# Patient Record
Sex: MALE | Race: WHITE
[De-identification: names, ages, dates, MRNs, and addresses within clinical notes are randomized per-mention and may not be internally consistent; named-entity substitution may affect disease eponyms.]

---

## 2017-04-26 ENCOUNTER — HOSPITAL ENCOUNTER (INPATIENT)
Dept: HOSPITAL 17 - NEPI | Age: 22
LOS: 13 days | Discharge: NURSING, REHAB OR LONG-TERM CARE | DRG: 955 | End: 2017-05-09
Attending: SURGERY | Admitting: SURGERY
Payer: MEDICAID

## 2017-04-26 VITALS — OXYGEN SATURATION: 99 %

## 2017-04-26 VITALS
RESPIRATION RATE: 16 BRPM | DIASTOLIC BLOOD PRESSURE: 70 MMHG | TEMPERATURE: 98 F | HEART RATE: 105 BPM | SYSTOLIC BLOOD PRESSURE: 112 MMHG | OXYGEN SATURATION: 95 %

## 2017-04-26 VITALS — OXYGEN SATURATION: 100 %

## 2017-04-26 VITALS
RESPIRATION RATE: 16 BRPM | SYSTOLIC BLOOD PRESSURE: 102 MMHG | DIASTOLIC BLOOD PRESSURE: 61 MMHG | OXYGEN SATURATION: 100 % | HEART RATE: 134 BPM | TEMPERATURE: 98.4 F

## 2017-04-26 VITALS — BODY MASS INDEX: 23.53 KG/M2 | OXYGEN SATURATION: 100 % | WEIGHT: 149.91 LBS | HEIGHT: 67 IN

## 2017-04-26 VITALS — HEART RATE: 130 BPM

## 2017-04-26 VITALS — HEART RATE: 140 BPM

## 2017-04-26 DIAGNOSIS — S80.811A: ICD-10-CM

## 2017-04-26 DIAGNOSIS — S20.312A: ICD-10-CM

## 2017-04-26 DIAGNOSIS — M62.838: ICD-10-CM

## 2017-04-26 DIAGNOSIS — G93.6: ICD-10-CM

## 2017-04-26 DIAGNOSIS — J69.0: ICD-10-CM

## 2017-04-26 DIAGNOSIS — S22.059A: ICD-10-CM

## 2017-04-26 DIAGNOSIS — F17.210: ICD-10-CM

## 2017-04-26 DIAGNOSIS — S22.049A: ICD-10-CM

## 2017-04-26 DIAGNOSIS — S40.811A: ICD-10-CM

## 2017-04-26 DIAGNOSIS — B49: ICD-10-CM

## 2017-04-26 DIAGNOSIS — F02.81: ICD-10-CM
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
## 2017-04-26 DIAGNOSIS — S06.6X0A: Primary | ICD-10-CM

## 2017-04-26 DIAGNOSIS — S12.110A: ICD-10-CM

## 2017-04-26 DIAGNOSIS — G93.40: ICD-10-CM

## 2017-04-26 DIAGNOSIS — A41.9: ICD-10-CM

## 2017-04-26 DIAGNOSIS — J96.01: ICD-10-CM

## 2017-04-26 DIAGNOSIS — F19.10: ICD-10-CM

## 2017-04-26 DIAGNOSIS — S22.039A: ICD-10-CM

## 2017-04-26 DIAGNOSIS — S12.500A: ICD-10-CM

## 2017-04-26 DIAGNOSIS — S80.812A: ICD-10-CM

## 2017-04-26 DIAGNOSIS — R00.1: ICD-10-CM

## 2017-04-26 DIAGNOSIS — S01.312A: ICD-10-CM

## 2017-04-26 DIAGNOSIS — F31.9: ICD-10-CM

## 2017-04-26 DIAGNOSIS — S06.360A: ICD-10-CM
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
## 2017-04-26 DIAGNOSIS — M51.26: ICD-10-CM

## 2017-04-26 DIAGNOSIS — S90.02XA: ICD-10-CM

## 2017-04-26 DIAGNOSIS — I10: ICD-10-CM

## 2017-04-26 DIAGNOSIS — Z88.5: ICD-10-CM

## 2017-04-26 DIAGNOSIS — S27.321A: ICD-10-CM

## 2017-04-26 DIAGNOSIS — S40.812A: ICD-10-CM
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
## 2017-04-26 DIAGNOSIS — S20.311A: ICD-10-CM

## 2017-04-26 DIAGNOSIS — I82.621: ICD-10-CM

## 2017-04-26 DIAGNOSIS — F19.20: ICD-10-CM
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
## 2017-04-26 LAB
AMPHETAMINE, URINE: (no result)
AMPHETAMINE, URINE: (no result)
APTT BLD: 30 SEC (ref 24.3–30.1)
BACTERIA #/AREA URNS HPF: (no result) /HPF
BARBITURATES, URINE: (no result)
BARBITURATES, URINE: (no result)
BASE EXCESS BLD CALC-SCNC: -1.3 MMOL/L (ref -2–2)
BASOPHILS # BLD AUTO: 0.1 TH/MM3 (ref 0–0.2)
BASOPHILS NFR BLD: 0.2 % (ref 0–2)
BENZODIAZEPINES PNL UR: 98 % (ref 90–100)
BLOOD GAS CARBOXYHEMOGLOBIN: 0.7 % (ref 0–4)
BLOOD GAS HCO3: 24 MMOL/L (ref 22–26)
BLOOD GAS OXYGEN CONTENT: 16.8 VOL % (ref 12–20)
BLOOD GAS PCO2: 46 MMHG (ref 38–42)
COCAINE UR-MCNC: (no result) NG/ML
COCAINE UR-MCNC: (no result) NG/ML
COLOR UR: YELLOW
COMMENT (UR): (no result)
CRITICAL VALUE: NO
CULTURE IF INDICATED: (no result)
DRAW SITE: (no result)
EOSINOPHIL # BLD: 0.1 TH/MM3 (ref 0–0.4)
EOSINOPHIL NFR BLD: 0.2 % (ref 0–4)
ERYTHROCYTE [DISTWIDTH] IN BLOOD BY AUTOMATED COUNT: 12.7 % (ref 11.6–17.2)
GLUCOSE UR STRIP-MCNC: (no result) MG/DL
HCT VFR BLD CALC: 36.2 % (ref 39–51)
HEMO FLAGS: (no result)
HGB UR QL STRIP: (no result)
HYALINE CASTS #/AREA URNS LPF: 18 /LPF
I-STAT POTASSIUM: 3.6 MMOL/L (ref 3.5–4.9)
I-STAT SODIUM: 140 MMOL/L (ref 138–146)
INR PPP: 1.1 RATIO
KETONES UR STRIP-MCNC: (no result) MG/DL
LYMPHOCYTES # BLD AUTO: 5.8 TH/MM3 (ref 1–4.8)
LYMPHOCYTES NFR BLD AUTO: 15.4 % (ref 9–44)
MCH RBC QN AUTO: 29.5 PG (ref 27–34)
MCHC RBC AUTO-ENTMCNC: 33.8 % (ref 32–36)
MCV RBC AUTO: 87.3 FL (ref 80–100)
METAMYELOCYTES NFR BLD: 1 % (ref 0–1)
METHGB MFR BLDA: 1.2 % (ref 0–2)
MONOCYTES NFR BLD: 4.9 % (ref 0–8)
MUCOUS THREADS #/AREA URNS LPF: (no result) /LPF
MYELOCYTES NFR BLD: 2 % (ref 0–0)
NEUTROPHILS # BLD AUTO: 29.8 TH/MM3 (ref 1.8–7.7)
NEUTROPHILS NFR BLD AUTO: 79.3 % (ref 16–70)
NEUTS BAND # BLD MANUAL: 27.8 TH/MM3 (ref 1.8–7.7)
NEUTS BAND NFR BLD: 19 % (ref 0–6)
NEUTS SEG NFR BLD MANUAL: 52 % (ref 16–70)
NITRITE UR QL STRIP: (no result)
NUMBER OF ARTERIAL PUNCTURES: 1
O2/TOTAL GAS SETTING VFR VENT: 100 %
OXYGEN DEVICE: (no result)
PLAT MORPH BLD: NORMAL
PLATELET # BLD: 470 TH/MM3 (ref 150–450)
PLATELET BLD QL SMEAR: (no result)
PO2 BLD: 548 MMHG (ref 61–120)
PROTHROMBIN TIME: 12.4 SEC (ref 9.8–11.6)
RBC # BLD AUTO: 4.14 MIL/MM3 (ref 4.5–5.9)
SALICYLATES SERPL-MCNC: 11.1 G/DL (ref 12–16)
SCAN/DIFF: (no result)
SP GR UR STRIP: (no result) (ref 1–1.03)
SQUAMOUS #/AREA URNS HPF: 2 /HPF (ref 0–5)
STAT: NO
TEMP CORR TO: 98.6
ULNAR PULSE: PRESENT
VENT SETTINGS: (no result)
WBC # BLD AUTO: 37.6 TH/MM3 (ref 4–11)
WBC DIFF SAMPLE: 100

## 2017-04-26 PROCEDURE — 76937 US GUIDE VASCULAR ACCESS: CPT

## 2017-04-26 PROCEDURE — 82565 ASSAY OF CREATININE: CPT

## 2017-04-26 PROCEDURE — 70486 CT MAXILLOFACIAL W/O DYE: CPT

## 2017-04-26 PROCEDURE — G0390 TRAUMA RESPONS W/HOSP CRITI: HCPCS

## 2017-04-26 PROCEDURE — 70450 CT HEAD/BRAIN W/O DYE: CPT

## 2017-04-26 PROCEDURE — 87040 BLOOD CULTURE FOR BACTERIA: CPT

## 2017-04-26 PROCEDURE — 93970 EXTREMITY STUDY: CPT

## 2017-04-26 PROCEDURE — 71010: CPT

## 2017-04-26 PROCEDURE — 80053 COMPREHEN METABOLIC PANEL: CPT

## 2017-04-26 PROCEDURE — 09Q1XZZ REPAIR LEFT EXTERNAL EAR, EXTERNAL APPROACH: ICD-10-PCS | Performed by: DENTIST

## 2017-04-26 PROCEDURE — 80307 DRUG TEST PRSMV CHEM ANLYZR: CPT

## 2017-04-26 PROCEDURE — 73610 X-RAY EXAM OF ANKLE: CPT

## 2017-04-26 PROCEDURE — 85025 COMPLETE CBC W/AUTO DIFF WBC: CPT

## 2017-04-26 PROCEDURE — 85007 BL SMEAR W/DIFF WBC COUNT: CPT

## 2017-04-26 PROCEDURE — 84520 ASSAY OF UREA NITROGEN: CPT

## 2017-04-26 PROCEDURE — 86920 COMPATIBILITY TEST SPIN: CPT

## 2017-04-26 PROCEDURE — 87070 CULTURE OTHR SPECIMN AEROBIC: CPT

## 2017-04-26 PROCEDURE — 84132 ASSAY OF SERUM POTASSIUM: CPT

## 2017-04-26 PROCEDURE — 84145 PROCALCITONIN (PCT): CPT

## 2017-04-26 PROCEDURE — 94640 AIRWAY INHALATION TREATMENT: CPT

## 2017-04-26 PROCEDURE — 87086 URINE CULTURE/COLONY COUNT: CPT

## 2017-04-26 PROCEDURE — 87641 MR-STAPH DNA AMP PROBE: CPT

## 2017-04-26 PROCEDURE — 87116 MYCOBACTERIA CULTURE: CPT

## 2017-04-26 PROCEDURE — 85018 HEMOGLOBIN: CPT

## 2017-04-26 PROCEDURE — 70496 CT ANGIOGRAPHY HEAD: CPT

## 2017-04-26 PROCEDURE — 87186 SC STD MICRODIL/AGAR DIL: CPT

## 2017-04-26 PROCEDURE — P9016 RBC LEUKOCYTES REDUCED: HCPCS

## 2017-04-26 PROCEDURE — 83735 ASSAY OF MAGNESIUM: CPT

## 2017-04-26 PROCEDURE — 85730 THROMBOPLASTIN TIME PARTIAL: CPT

## 2017-04-26 PROCEDURE — 82435 ASSAY OF BLOOD CHLORIDE: CPT

## 2017-04-26 PROCEDURE — 94664 DEMO&/EVAL PT USE INHALER: CPT

## 2017-04-26 PROCEDURE — 84100 ASSAY OF PHOSPHORUS: CPT

## 2017-04-26 PROCEDURE — L0810 HALO CERVICAL INTO JCKT VEST: HCPCS

## 2017-04-26 PROCEDURE — 36430 TRANSFUSION BLD/BLD COMPNT: CPT

## 2017-04-26 PROCEDURE — 72170 X-RAY EXAM OF PELVIS: CPT

## 2017-04-26 PROCEDURE — 82947 ASSAY GLUCOSE BLOOD QUANT: CPT

## 2017-04-26 PROCEDURE — 72020 X-RAY EXAM OF SPINE 1 VIEW: CPT

## 2017-04-26 PROCEDURE — 84295 ASSAY OF SERUM SODIUM: CPT

## 2017-04-26 PROCEDURE — 80048 BASIC METABOLIC PNL TOTAL CA: CPT

## 2017-04-26 PROCEDURE — 94002 VENT MGMT INPAT INIT DAY: CPT

## 2017-04-26 PROCEDURE — L0150 CERV SEMI-RIG ADJ MOLDED CHN: HCPCS

## 2017-04-26 PROCEDURE — 86403 PARTICLE AGGLUT ANTBDY SCRN: CPT

## 2017-04-26 PROCEDURE — 90471 IMMUNIZATION ADMIN: CPT

## 2017-04-26 PROCEDURE — 82948 REAGENT STRIP/BLOOD GLUCOSE: CPT

## 2017-04-26 PROCEDURE — 87205 SMEAR GRAM STAIN: CPT

## 2017-04-26 PROCEDURE — 85610 PROTHROMBIN TIME: CPT

## 2017-04-26 PROCEDURE — 74177 CT ABD & PELVIS W/CONTRAST: CPT

## 2017-04-26 PROCEDURE — 96374 THER/PROPH/DIAG INJ IV PUSH: CPT

## 2017-04-26 PROCEDURE — 5A1955Z RESPIRATORY VENTILATION, GREATER THAN 96 CONSECUTIVE HOURS: ICD-10-PCS | Performed by: SURGERY

## 2017-04-26 PROCEDURE — 94150 VITAL CAPACITY TEST: CPT

## 2017-04-26 PROCEDURE — 83930 ASSAY OF BLOOD OSMOLALITY: CPT

## 2017-04-26 PROCEDURE — 72131 CT LUMBAR SPINE W/O DYE: CPT

## 2017-04-26 PROCEDURE — 99291 CRITICAL CARE FIRST HOUR: CPT

## 2017-04-26 PROCEDURE — 87206 SMEAR FLUORESCENT/ACID STAI: CPT

## 2017-04-26 PROCEDURE — 86850 RBC ANTIBODY SCREEN: CPT

## 2017-04-26 PROCEDURE — 96375 TX/PRO/DX INJ NEW DRUG ADDON: CPT

## 2017-04-26 PROCEDURE — 71260 CT THORAX DX C+: CPT

## 2017-04-26 PROCEDURE — 85014 HEMATOCRIT: CPT

## 2017-04-26 PROCEDURE — 85027 COMPLETE CBC AUTOMATED: CPT

## 2017-04-26 PROCEDURE — 61210 BURR HOLE IMPLT VENTR CATH: CPT

## 2017-04-26 PROCEDURE — 72128 CT CHEST SPINE W/O DYE: CPT

## 2017-04-26 PROCEDURE — 81001 URINALYSIS AUTO W/SCOPE: CPT

## 2017-04-26 PROCEDURE — G0481 DRUG TEST DEF 8-14 CLASSES: HCPCS

## 2017-04-26 PROCEDURE — 86901 BLOOD TYPING SEROLOGIC RH(D): CPT

## 2017-04-26 PROCEDURE — 93306 TTE W/DOPPLER COMPLETE: CPT

## 2017-04-26 PROCEDURE — 36600 WITHDRAWAL OF ARTERIAL BLOOD: CPT

## 2017-04-26 PROCEDURE — 87015 SPECIMEN INFECT AGNT CONCNTJ: CPT

## 2017-04-26 PROCEDURE — 94770: CPT

## 2017-04-26 PROCEDURE — 94003 VENT MGMT INPAT SUBQ DAY: CPT

## 2017-04-26 PROCEDURE — 72125 CT NECK SPINE W/O DYE: CPT

## 2017-04-26 PROCEDURE — 82805 BLOOD GASES W/O2 SATURATION: CPT

## 2017-04-26 PROCEDURE — 87147 CULTURE TYPE IMMUNOLOGIC: CPT

## 2017-04-26 PROCEDURE — 36556 INSERT NON-TUNNEL CV CATH: CPT

## 2017-04-26 PROCEDURE — 009630Z DRAINAGE OF CEREBRAL VENTRICLE WITH DRAINAGE DEVICE, PERCUTANEOUS APPROACH: ICD-10-PCS | Performed by: NEUROLOGICAL SURGERY

## 2017-04-26 PROCEDURE — 87102 FUNGUS ISOLATION CULTURE: CPT

## 2017-04-26 PROCEDURE — 80202 ASSAY OF VANCOMYCIN: CPT

## 2017-04-26 PROCEDURE — 86900 BLOOD TYPING SEROLOGIC ABO: CPT

## 2017-04-26 PROCEDURE — L0172 CERV COL SR FOAM 2PC PRE OTS: HCPCS

## 2017-04-26 PROCEDURE — 84155 ASSAY OF PROTEIN SERUM: CPT

## 2017-04-26 RX ADMIN — SODIUM CHLORIDE SCH MLS/HR: 234 INJECTION INTRAMUSCULAR; INTRAVENOUS; SUBCUTANEOUS at 20:00

## 2017-04-26 RX ADMIN — SODIUM CHLORIDE SCH MLS/HR: 234 INJECTION INTRAMUSCULAR; INTRAVENOUS; SUBCUTANEOUS at 21:37

## 2017-04-26 RX ADMIN — PROPOFOL SCH MLS/HR: 10 INJECTION, EMULSION INTRAVENOUS at 21:07

## 2017-04-26 RX ADMIN — IPRATROPIUM BROMIDE AND ALBUTEROL SULFATE SCH AMPULE: .5; 3 SOLUTION RESPIRATORY (INHALATION) at 21:46

## 2017-04-26 RX ADMIN — BACITRACIN SCH APPLIC: 500 OINTMENT TOPICAL at 21:00

## 2017-04-26 RX ADMIN — PYRIDOXINE HYDROCHLORIDE SCH MLS/HR: 100 INJECTION, SOLUTION INTRAMUSCULAR; INTRAVENOUS at 20:00

## 2017-04-26 RX ADMIN — POLYVINYL ALCOHOL SCH DROP: 14 SOLUTION/ DROPS OPHTHALMIC at 20:00

## 2017-04-26 RX ADMIN — DOCUSATE SODIUM SCH MG: 50 LIQUID ORAL at 21:00

## 2017-04-26 RX ADMIN — Medication SCH ML: at 21:00

## 2017-04-26 RX ADMIN — CHLORHEXIDINE GLUCONATE 0.12% ORAL RINSE SCH ML: 1.2 LIQUID ORAL at 20:00

## 2017-04-26 RX ADMIN — FAMOTIDINE SCH MG: 10 INJECTION, SOLUTION INTRAVENOUS at 21:00
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
## 2017-04-26 NOTE — PD.OP
__________________________________________________





Operative Report


Date of Surgery:  Apr 26, 2017


Preoperative Diagnosis:  


Severe traumatic brain injury with subarachnoid and intraventricular hemorrhage


Postoperative Diagnosis:  


Same


Procedure:


Right frontal twist drill hole ventriculostomy placement


Anesthesia:


Local with sedation


Surgeon:


Trey Hall M.D.


Assistant(s):


None


Operation and Findings:


Following continuation of Diprivan drip and fentanyl bolus with the oxygen 

saturation and hemodynamic monitoring in the intensive care unit, the right 

frontal region was shaved and the prep with chlor prep and sterilely draped.  

Verbal consent was obtained from the mother. Using landmarks of 11 cm behind 

the nasion and 3 cm to the right of the midline, a 1 cm scalp incision was made 

after infiltrating with 1% lidocaine with epinephrine solution.  With a 

handheld drill a twist drill hole was made in the underlying dura penetrated 

with a blunt probe.  The bactiseal ventriculostomy catheter was then passed 

into the lateral ventricle at a depth of 7 cm the CSF encountered with an 

opening pressure around 88 cm H20.  After drainage of CSF the pressures were 

down to 7 cm H20. The distal end of the ventriculostomy was then tunneled under 

the scalp with a trocar and secured to the exit site with a 3-0 nylon thigh and 

connected to a drainage bag.  Scalp incision site was approximated with 3-0 

nylon interrupted stitches  A sterile dressing was applied.  There were no 

complications and blood loss was less than 10 cc.








Trey Hall MD Apr 26, 2017 19:07
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
## 2017-04-27 VITALS — HEART RATE: 138 BPM

## 2017-04-27 VITALS
OXYGEN SATURATION: 99 % | TEMPERATURE: 98.6 F | HEART RATE: 119 BPM | RESPIRATION RATE: 20 BRPM | SYSTOLIC BLOOD PRESSURE: 98 MMHG | DIASTOLIC BLOOD PRESSURE: 59 MMHG

## 2017-04-27 VITALS — OXYGEN SATURATION: 100 %

## 2017-04-27 VITALS
HEART RATE: 102 BPM | OXYGEN SATURATION: 100 % | SYSTOLIC BLOOD PRESSURE: 135 MMHG | TEMPERATURE: 98.4 F | DIASTOLIC BLOOD PRESSURE: 60 MMHG | RESPIRATION RATE: 20 BRPM

## 2017-04-27 VITALS
TEMPERATURE: 99.5 F | OXYGEN SATURATION: 100 % | HEART RATE: 108 BPM | DIASTOLIC BLOOD PRESSURE: 67 MMHG | SYSTOLIC BLOOD PRESSURE: 155 MMHG | RESPIRATION RATE: 20 BRPM

## 2017-04-27 VITALS — HEART RATE: 102 BPM

## 2017-04-27 VITALS
TEMPERATURE: 99 F | HEART RATE: 128 BPM | OXYGEN SATURATION: 100 % | SYSTOLIC BLOOD PRESSURE: 115 MMHG | DIASTOLIC BLOOD PRESSURE: 64 MMHG | RESPIRATION RATE: 18 BRPM

## 2017-04-27 VITALS — HEART RATE: 109 BPM

## 2017-04-27 VITALS
HEART RATE: 94 BPM | SYSTOLIC BLOOD PRESSURE: 110 MMHG | TEMPERATURE: 98 F | RESPIRATION RATE: 20 BRPM | OXYGEN SATURATION: 100 % | DIASTOLIC BLOOD PRESSURE: 55 MMHG

## 2017-04-27 VITALS
TEMPERATURE: 101.9 F | DIASTOLIC BLOOD PRESSURE: 70 MMHG | OXYGEN SATURATION: 100 % | SYSTOLIC BLOOD PRESSURE: 143 MMHG | RESPIRATION RATE: 20 BRPM | HEART RATE: 132 BPM

## 2017-04-27 VITALS — HEART RATE: 137 BPM

## 2017-04-27 VITALS — HEART RATE: 96 BPM

## 2017-04-27 VITALS — HEART RATE: 110 BPM

## 2017-04-27 VITALS — HEART RATE: 107 BPM

## 2017-04-27 LAB
ANION GAP SERPL CALC-SCNC: 10 MEQ/L (ref 5–15)
BASE EXCESS BLD CALC-SCNC: -0.9 MMOL/L (ref -2–2)
BASE EXCESS BLD CALC-SCNC: -1.6 MMOL/L (ref -2–2)
BENZODIAZEPINES PNL UR: 98 % (ref 90–100)
BENZODIAZEPINES PNL UR: 98 % (ref 90–100)
BLOOD GAS CARBOXYHEMOGLOBIN: 0.7 % (ref 0–4)
BLOOD GAS CARBOXYHEMOGLOBIN: 0.7 % (ref 0–4)
BLOOD GAS HCO3: 22 MMOL/L (ref 22–26)
BLOOD GAS HCO3: 24 MMOL/L (ref 22–26)
BLOOD GAS OXYGEN CONTENT: 13.7 VOL % (ref 12–20)
BLOOD GAS OXYGEN CONTENT: 15 VOL % (ref 12–20)
BLOOD GAS PCO2: 33 MMHG (ref 38–42)
BLOOD GAS PCO2: 40 MMHG (ref 38–42)
BUN SERPL-MCNC: 12 MG/DL (ref 7–18)
CALCIUM TP COR SERPL-MCNC: 8.3 MG/DL (ref 8.5–10.1)
CHLORIDE SERPL-SCNC: 109 MEQ/L (ref 98–107)
CRITICAL VALUE: NO
CRITICAL VALUE: NO
DRAW SITE: (no result)
DRAW SITE: (no result)
ERYTHROCYTE [DISTWIDTH] IN BLOOD BY AUTOMATED COUNT: 12.5 % (ref 11.6–17.2)
GFR SERPLBLD BASED ON 1.73 SQ M-ARVRAT: 79 ML/MIN (ref 89–?)
HCO3 BLD-SCNC: 23.3 MEQ/L (ref 21–32)
HCT VFR BLD CALC: 28.3 % (ref 39–51)
MCH RBC QN AUTO: 29.9 PG (ref 27–34)
MCHC RBC AUTO-ENTMCNC: 34.6 % (ref 32–36)
MCV RBC AUTO: 86.2 FL (ref 80–100)
METHGB MFR BLDA: 0.9 % (ref 0–2)
METHGB MFR BLDA: 1 % (ref 0–2)
NUMBER OF ARTERIAL PUNCTURES: 1
O2/TOTAL GAS SETTING VFR VENT: 40 %
O2/TOTAL GAS SETTING VFR VENT: 40 %
OXYGEN DEVICE: (no result)
OXYGEN DEVICE: (no result)
PLATELET # BLD: 371 TH/MM3 (ref 150–450)
PO2 BLD: 146 MMHG (ref 61–120)
PO2 BLD: 165 MMHG (ref 61–120)
POTASSIUM SERPL-SCNC: 4.4 MEQ/L (ref 3.5–5.1)
RBC # BLD AUTO: 3.29 MIL/MM3 (ref 4.5–5.9)
REVIEW FLAG: (no result)
SALICYLATES SERPL-MCNC: 10.8 G/DL (ref 12–16)
SALICYLATES SERPL-MCNC: 9.7 G/DL (ref 12–16)
SODIUM SERPL-SCNC: 142 MEQ/L (ref 136–145)
STAT: NO
STAT: NO
TEMP CORR TO: 98.6
TEMP CORR TO: 98.6
ULNAR PULSE: PRESENT
VENT SETTINGS: (no result)
VENT SETTINGS: (no result)
WBC # BLD AUTO: 18 TH/MM3 (ref 4–11)

## 2017-04-27 PROCEDURE — 4A133J1 MONITORING OF ARTERIAL PULSE, PERIPHERAL, PERCUTANEOUS APPROACH: ICD-10-PCS | Performed by: SURGERY

## 2017-04-27 PROCEDURE — 4A133B1 MONITORING OF ARTERIAL PRESSURE, PERIPHERAL, PERCUTANEOUS APPROACH: ICD-10-PCS | Performed by: SURGERY

## 2017-04-27 PROCEDURE — 05H533Z INSERTION OF INFUSION DEVICE INTO RIGHT SUBCLAVIAN VEIN, PERCUTANEOUS APPROACH: ICD-10-PCS | Performed by: SURGERY

## 2017-04-27 PROCEDURE — 03HY32Z INSERTION OF MONITORING DEVICE INTO UPPER ARTERY, PERCUTANEOUS APPROACH: ICD-10-PCS | Performed by: SURGERY

## 2017-04-27 RX ADMIN — LEVETIRACETAM SCH MLS/HR: 100 INJECTION, SOLUTION, CONCENTRATE INTRAVENOUS at 20:13

## 2017-04-27 RX ADMIN — SODIUM CHLORIDE SCH MLS/HR: 900 INJECTION INTRAVENOUS at 03:50

## 2017-04-27 RX ADMIN — FAMOTIDINE SCH MG: 10 INJECTION, SOLUTION INTRAVENOUS at 20:13

## 2017-04-27 RX ADMIN — PROPRANOLOL HYDROCHLORIDE SCH MG: 10 TABLET ORAL at 05:35

## 2017-04-27 RX ADMIN — BACITRACIN SCH APPLIC: 500 OINTMENT TOPICAL at 20:14

## 2017-04-27 RX ADMIN — WATER SCH ML: 1 IRRIGANT IRRIGATION at 08:29

## 2017-04-27 RX ADMIN — IPRATROPIUM BROMIDE AND ALBUTEROL SULFATE SCH AMPULE: .5; 3 SOLUTION RESPIRATORY (INHALATION) at 22:15

## 2017-04-27 RX ADMIN — PROPOFOL SCH MLS/HR: 10 INJECTION, EMULSION INTRAVENOUS at 10:23

## 2017-04-27 RX ADMIN — Medication SCH ML: at 08:29

## 2017-04-27 RX ADMIN — PROPRANOLOL HYDROCHLORIDE SCH MG: 10 TABLET ORAL at 03:51

## 2017-04-27 RX ADMIN — PROPOFOL SCH MLS/HR: 10 INJECTION, EMULSION INTRAVENOUS at 03:51

## 2017-04-27 RX ADMIN — IPRATROPIUM BROMIDE AND ALBUTEROL SULFATE SCH AMPULE: .5; 3 SOLUTION RESPIRATORY (INHALATION) at 08:10

## 2017-04-27 RX ADMIN — DOCUSATE SODIUM SCH MG: 50 LIQUID ORAL at 20:13

## 2017-04-27 RX ADMIN — IPRATROPIUM BROMIDE AND ALBUTEROL SULFATE SCH AMPULE: .5; 3 SOLUTION RESPIRATORY (INHALATION) at 03:41

## 2017-04-27 RX ADMIN — POLYVINYL ALCOHOL SCH DROP: 14 SOLUTION/ DROPS OPHTHALMIC at 13:54

## 2017-04-27 RX ADMIN — PROPOFOL SCH MLS/HR: 10 INJECTION, EMULSION INTRAVENOUS at 16:46

## 2017-04-27 RX ADMIN — LEVETIRACETAM SCH MLS/HR: 100 INJECTION, SOLUTION, CONCENTRATE INTRAVENOUS at 10:23

## 2017-04-27 RX ADMIN — PROPOFOL SCH MLS/HR: 10 INJECTION, EMULSION INTRAVENOUS at 21:24

## 2017-04-27 RX ADMIN — PYRIDOXINE HYDROCHLORIDE SCH MLS/HR: 100 INJECTION, SOLUTION INTRAMUSCULAR; INTRAVENOUS at 20:13

## 2017-04-27 RX ADMIN — Medication SCH MLS/HR: at 10:23

## 2017-04-27 RX ADMIN — CHLORHEXIDINE GLUCONATE 0.12% ORAL RINSE SCH ML: 1.2 LIQUID ORAL at 20:00

## 2017-04-27 RX ADMIN — FAMOTIDINE SCH MG: 10 INJECTION, SOLUTION INTRAVENOUS at 08:53

## 2017-04-27 RX ADMIN — POLYVINYL ALCOHOL SCH DROP: 14 SOLUTION/ DROPS OPHTHALMIC at 16:47

## 2017-04-27 RX ADMIN — IPRATROPIUM BROMIDE AND ALBUTEROL SULFATE SCH AMPULE: .5; 3 SOLUTION RESPIRATORY (INHALATION) at 16:18

## 2017-04-27 RX ADMIN — PROPRANOLOL HYDROCHLORIDE SCH MG: 10 TABLET ORAL at 14:00

## 2017-04-27 RX ADMIN — Medication SCH ML: at 20:13

## 2017-04-27 RX ADMIN — DOCUSATE SODIUM SCH MG: 50 LIQUID ORAL at 08:29

## 2017-04-27 RX ADMIN — INSULIN ASPART SCH: 100 INJECTION, SOLUTION INTRAVENOUS; SUBCUTANEOUS at 21:00

## 2017-04-27 RX ADMIN — CHLORHEXIDINE GLUCONATE SCH PACK: 500 CLOTH TOPICAL at 04:00

## 2017-04-27 RX ADMIN — INSULIN ASPART SCH: 100 INJECTION, SOLUTION INTRAVENOUS; SUBCUTANEOUS at 16:00

## 2017-04-27 RX ADMIN — POLYVINYL ALCOHOL SCH DROP: 14 SOLUTION/ DROPS OPHTHALMIC at 08:28

## 2017-04-27 RX ADMIN — CHLORHEXIDINE GLUCONATE 0.12% ORAL RINSE SCH ML: 1.2 LIQUID ORAL at 08:28

## 2017-04-27 RX ADMIN — IPRATROPIUM BROMIDE AND ALBUTEROL SULFATE SCH AMPULE: .5; 3 SOLUTION RESPIRATORY (INHALATION) at 07:54

## 2017-04-27 RX ADMIN — BACITRACIN SCH APPLIC: 500 OINTMENT TOPICAL at 08:29
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
## 2017-04-27 NOTE — RADRPT
EXAM DATE/TIME:  04/27/2017 11:05 

 

[facility] COMPARISON:     

No previous studies available for comparison.

 

 

INDICATIONS :     

Follow up traumatic brain injury

                      

 

IV CONTRAST:     

70 cc Omnipaque 350 (iohexol) IV 

                      

 

RADIATION DOSE:     

18.85 CTDIvol (mGy) 

 

 

MEDICAL HISTORY :     

Non-responsive.  

 

SURGICAL HISTORY :      

Non-responsive. 

 

ENCOUNTER:      

Subsequent

 

ACUITY:      

1 day

 

PAIN SCALE:      

Non-responsive

 

LOCATION:       

Bilateral cranial 

 

TECHNIQUE:     

Volumetric scanning was performed using a multi-row detector CT scanner.  The data was post processed
 with a variety of visualization algorithms including full volume maximum intensity projection, multi
-planar sliding thin slab reformation, curved planar reformation, and surface rendering techniques.  
Using automated exposure control and adjustment of the mA and/or kV according to patient size, radiat
ion dose was kept as low as reasonably achievable to obtain optimal diagnostic quality images. 

 

FINDINGS:     

There is excellent visualization of the major intracranial arteries out to the second-order branch ve
ssels.  There is no evidence for aneurysm, vessel truncation or stenosis, and no evidence for vascula
r malformation.

 

Anatomic variant of the [place]. The right posterior communicating artery is diminutive but 
patent. 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
nicating arteries.

 

CONCLUSION:     

1. Anatomic alignment of the [place] of [place] as above. Patient is right vertebral dominant.

2. Otherwise, intracranial vessels are patent without aneurysmal disease

 

 

 

 Colby Pearce MD on April 27, 2017 at 11:42           

Board Certified Radiologist.

 This report was verified electronically.

## 2017-04-27 NOTE — PD.PROCEDR
Procedure Note


Procedure








DX:   Severe closed head injury, subarachnoid hemorrhage





OP:   1. Insertion Right Radial Arterial line ([id number])





      2. Insertion Left Subclavian Central Venous Line ([id number])





Procedure:        Husam test normal right hand. Right wrist supinated and 

prepped and draped. Right radial artery cannulated with 22 gauge needle and 

wire easily advanced. Cannula passed over wire to 3 cm. Good waveform observed. 

Dressing applied. Left chest prepped and draped. Cervical collar left in place. 

Left subclavian vein cannulated and wire easily advanced. Catheter passed over 

wire to 18 cm. Lumens aspirated and flushed. Dressing applied. CXR ordered, 

will review.








Josh Jesus MD Apr 27, 2017 03:28
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
## 2017-04-28 VITALS
DIASTOLIC BLOOD PRESSURE: 65 MMHG | OXYGEN SATURATION: 100 % | TEMPERATURE: 98.4 F | SYSTOLIC BLOOD PRESSURE: 149 MMHG | RESPIRATION RATE: 18 BRPM | HEART RATE: 105 BPM

## 2017-04-28 VITALS
DIASTOLIC BLOOD PRESSURE: 64 MMHG | RESPIRATION RATE: 20 BRPM | SYSTOLIC BLOOD PRESSURE: 142 MMHG | HEART RATE: 111 BPM | TEMPERATURE: 99.3 F | OXYGEN SATURATION: 100 %

## 2017-04-28 VITALS
RESPIRATION RATE: 18 BRPM | TEMPERATURE: 98.6 F | DIASTOLIC BLOOD PRESSURE: 65 MMHG | SYSTOLIC BLOOD PRESSURE: 142 MMHG | OXYGEN SATURATION: 100 % | HEART RATE: 98 BPM

## 2017-04-28 VITALS — OXYGEN SATURATION: 100 %

## 2017-04-28 VITALS — HEART RATE: 96 BPM

## 2017-04-28 VITALS
OXYGEN SATURATION: 100 % | SYSTOLIC BLOOD PRESSURE: 141 MMHG | RESPIRATION RATE: 18 BRPM | DIASTOLIC BLOOD PRESSURE: 65 MMHG | TEMPERATURE: 98.8 F | HEART RATE: 96 BPM

## 2017-04-28 VITALS — HEART RATE: 98 BPM

## 2017-04-28 VITALS
OXYGEN SATURATION: 98 % | TEMPERATURE: 98.6 F | RESPIRATION RATE: 18 BRPM | HEART RATE: 99 BPM | SYSTOLIC BLOOD PRESSURE: 137 MMHG | DIASTOLIC BLOOD PRESSURE: 73 MMHG

## 2017-04-28 VITALS
OXYGEN SATURATION: 100 % | DIASTOLIC BLOOD PRESSURE: 62 MMHG | HEART RATE: 102 BPM | RESPIRATION RATE: 16 BRPM | SYSTOLIC BLOOD PRESSURE: 164 MMHG | TEMPERATURE: 99.7 F

## 2017-04-28 VITALS
RESPIRATION RATE: 18 BRPM | HEART RATE: 102 BPM | OXYGEN SATURATION: 100 % | TEMPERATURE: 98.8 F | SYSTOLIC BLOOD PRESSURE: 143 MMHG | DIASTOLIC BLOOD PRESSURE: 69 MMHG

## 2017-04-28 VITALS
TEMPERATURE: 98.6 F | RESPIRATION RATE: 18 BRPM | DIASTOLIC BLOOD PRESSURE: 54 MMHG | SYSTOLIC BLOOD PRESSURE: 139 MMHG | OXYGEN SATURATION: 100 % | HEART RATE: 97 BPM

## 2017-04-28 VITALS
DIASTOLIC BLOOD PRESSURE: 50 MMHG | RESPIRATION RATE: 16 BRPM | SYSTOLIC BLOOD PRESSURE: 134 MMHG | TEMPERATURE: 99.3 F | OXYGEN SATURATION: 100 % | HEART RATE: 93 BPM

## 2017-04-28 VITALS — HEART RATE: 94 BPM

## 2017-04-28 VITALS — HEART RATE: 100 BPM

## 2017-04-28 VITALS — HEART RATE: 106 BPM

## 2017-04-28 LAB
ALP SERPL-CCNC: 43 U/L (ref 45–117)
ALT SERPL-CCNC: 98 U/L (ref 9–52)
ANION GAP SERPL CALC-SCNC: 7 MEQ/L (ref 5–15)
AST SERPL-CCNC: 61 U/L (ref 15–39)
BASE EXCESS BLD CALC-SCNC: -0.6 MMOL/L (ref -2–2)
BASOPHILS # BLD AUTO: 0 TH/MM3 (ref 0–0.2)
BASOPHILS NFR BLD: 0.2 % (ref 0–2)
BENZODIAZEPINES PNL UR: 98 % (ref 90–100)
BILIRUB SERPL-MCNC: 0.2 MG/DL (ref 0.2–1)
BLOOD GAS CARBOXYHEMOGLOBIN: 0.9 % (ref 0–4)
BLOOD GAS HCO3: 23 MMOL/L (ref 22–26)
BLOOD GAS OXYGEN CONTENT: 12.6 VOL % (ref 12–20)
BLOOD GAS PCO2: 32 MMHG (ref 38–42)
BUN SERPL-MCNC: 13 MG/DL (ref 7–18)
CHLORIDE SERPL-SCNC: 116 MEQ/L (ref 98–107)
CRITICAL VALUE: NO
DRAW SITE: (no result)
EOSINOPHIL # BLD: 0.1 TH/MM3 (ref 0–0.4)
EOSINOPHIL NFR BLD: 0.7 % (ref 0–4)
ERYTHROCYTE [DISTWIDTH] IN BLOOD BY AUTOMATED COUNT: 12.5 % (ref 11.6–17.2)
GFR SERPLBLD BASED ON 1.73 SQ M-ARVRAT: 144 ML/MIN (ref 89–?)
HCO3 BLD-SCNC: 23.7 MEQ/L (ref 21–32)
HCT VFR BLD CALC: 21.4 % (ref 39–51)
HEMO FLAGS: (no result)
LYMPHOCYTES # BLD AUTO: 1.5 TH/MM3 (ref 1–4.8)
LYMPHOCYTES NFR BLD AUTO: 14.4 % (ref 9–44)
MCH RBC QN AUTO: 29.4 PG (ref 27–34)
MCHC RBC AUTO-ENTMCNC: 34.2 % (ref 32–36)
MCV RBC AUTO: 86.1 FL (ref 80–100)
METHGB MFR BLDA: 0.8 % (ref 0–2)
MONOCYTES NFR BLD: 9.7 % (ref 0–8)
NEUTROPHILS # BLD AUTO: 7.7 TH/MM3 (ref 1.8–7.7)
NEUTROPHILS NFR BLD AUTO: 75 % (ref 16–70)
O2/TOTAL GAS SETTING VFR VENT: 35 %
OXYGEN DEVICE: (no result)
PLATELET # BLD: 228 TH/MM3 (ref 150–450)
PO2 BLD: 181 MMHG (ref 61–120)
POTASSIUM SERPL-SCNC: 3.8 MEQ/L (ref 3.5–5.1)
RBC # BLD AUTO: 2.49 MIL/MM3 (ref 4.5–5.9)
SALICYLATES SERPL-MCNC: 8.9 G/DL (ref 12–16)
SODIUM SERPL-SCNC: 147 MEQ/L (ref 136–145)
STAT: YES
TEMP CORR TO: 98.6
WBC # BLD AUTO: 10.3 TH/MM3 (ref 4–11)

## 2017-04-28 PROCEDURE — 30233N1 TRANSFUSION OF NONAUTOLOGOUS RED BLOOD CELLS INTO PERIPHERAL VEIN, PERCUTANEOUS APPROACH: ICD-10-PCS | Performed by: SURGERY

## 2017-04-28 RX ADMIN — PROPOFOL SCH MLS/HR: 10 INJECTION, EMULSION INTRAVENOUS at 20:15

## 2017-04-28 RX ADMIN — Medication SCH ML: at 07:28

## 2017-04-28 RX ADMIN — PYRIDOXINE HYDROCHLORIDE SCH MLS/HR: 100 INJECTION, SOLUTION INTRAMUSCULAR; INTRAVENOUS at 20:18

## 2017-04-28 RX ADMIN — FAMOTIDINE SCH MG: 10 INJECTION, SOLUTION INTRAVENOUS at 08:29

## 2017-04-28 RX ADMIN — INSULIN ASPART SCH: 100 INJECTION, SOLUTION INTRAVENOUS; SUBCUTANEOUS at 07:00

## 2017-04-28 RX ADMIN — POLYVINYL ALCOHOL SCH DROP: 14 SOLUTION/ DROPS OPHTHALMIC at 17:59

## 2017-04-28 RX ADMIN — PROPOFOL SCH MLS/HR: 10 INJECTION, EMULSION INTRAVENOUS at 13:22

## 2017-04-28 RX ADMIN — INSULIN ASPART SCH: 100 INJECTION, SOLUTION INTRAVENOUS; SUBCUTANEOUS at 16:00

## 2017-04-28 RX ADMIN — INSULIN ASPART SCH: 100 INJECTION, SOLUTION INTRAVENOUS; SUBCUTANEOUS at 11:00

## 2017-04-28 RX ADMIN — Medication SCH ML: at 21:00

## 2017-04-28 RX ADMIN — Medication SCH MLS/HR: at 13:22

## 2017-04-28 RX ADMIN — DOCUSATE SODIUM SCH MG: 50 LIQUID ORAL at 20:15

## 2017-04-28 RX ADMIN — IPRATROPIUM BROMIDE AND ALBUTEROL SULFATE SCH AMPULE: .5; 3 SOLUTION RESPIRATORY (INHALATION) at 21:01

## 2017-04-28 RX ADMIN — MAGNESIUM HYDROXIDE SCH ML: 400 SUSPENSION ORAL at 21:00

## 2017-04-28 RX ADMIN — IPRATROPIUM BROMIDE AND ALBUTEROL SULFATE SCH AMPULE: .5; 3 SOLUTION RESPIRATORY (INHALATION) at 08:10

## 2017-04-28 RX ADMIN — CHLORHEXIDINE GLUCONATE 0.12% ORAL RINSE SCH ML: 1.2 LIQUID ORAL at 07:28

## 2017-04-28 RX ADMIN — CHLORHEXIDINE GLUCONATE SCH PACK: 500 CLOTH TOPICAL at 04:00

## 2017-04-28 RX ADMIN — WATER SCH ML: 1 IRRIGANT IRRIGATION at 08:28

## 2017-04-28 RX ADMIN — LEVETIRACETAM SCH MLS/HR: 100 INJECTION, SOLUTION, CONCENTRATE INTRAVENOUS at 08:29

## 2017-04-28 RX ADMIN — INSULIN ASPART SCH: 100 INJECTION, SOLUTION INTRAVENOUS; SUBCUTANEOUS at 21:00

## 2017-04-28 RX ADMIN — FAMOTIDINE SCH MG: 10 INJECTION, SOLUTION INTRAVENOUS at 20:16

## 2017-04-28 RX ADMIN — SODIUM CHLORIDE SCH MLS/HR: 900 INJECTION INTRAVENOUS at 03:25

## 2017-04-28 RX ADMIN — IPRATROPIUM BROMIDE AND ALBUTEROL SULFATE SCH AMPULE: .5; 3 SOLUTION RESPIRATORY (INHALATION) at 02:56

## 2017-04-28 RX ADMIN — POLYVINYL ALCOHOL SCH DROP: 14 SOLUTION/ DROPS OPHTHALMIC at 07:28

## 2017-04-28 RX ADMIN — PROPOFOL SCH MLS/HR: 10 INJECTION, EMULSION INTRAVENOUS at 02:49

## 2017-04-28 RX ADMIN — DOCUSATE SODIUM SCH MG: 50 LIQUID ORAL at 08:28

## 2017-04-28 RX ADMIN — LEVETIRACETAM SCH MLS/HR: 100 INJECTION, SOLUTION, CONCENTRATE INTRAVENOUS at 20:15

## 2017-04-28 RX ADMIN — CHLORHEXIDINE GLUCONATE 0.12% ORAL RINSE SCH ML: 1.2 LIQUID ORAL at 20:00

## 2017-04-28 RX ADMIN — PROPOFOL SCH MLS/HR: 10 INJECTION, EMULSION INTRAVENOUS at 07:48

## 2017-04-28 RX ADMIN — IPRATROPIUM BROMIDE AND ALBUTEROL SULFATE SCH AMPULE: .5; 3 SOLUTION RESPIRATORY (INHALATION) at 17:00

## 2017-04-28 RX ADMIN — BACITRACIN SCH APPLIC: 500 OINTMENT TOPICAL at 07:28

## 2017-04-28 RX ADMIN — BACITRACIN SCH APPLIC: 500 OINTMENT TOPICAL at 21:00

## 2017-04-28 RX ADMIN — POLYVINYL ALCOHOL SCH DROP: 14 SOLUTION/ DROPS OPHTHALMIC at 13:23

## 2017-04-28 RX ADMIN — SODIUM CHLORIDE SCH MLS/HR: 3 INJECTION, SOLUTION INTRAVENOUS at 13:22
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
## 2017-04-28 NOTE — RADRPT
EXAM DATE/TIME:  04/28/2017 03:32 

 

[facility] COMPARISON:     

CHEST SINGLE AP, April 27, 2017, 3:44.

 

                     

INDICATIONS :     

Shortness of breath.

                     

 

MEDICAL HISTORY :     

None.          

 

SURGICAL HISTORY :     

None.   

 

ENCOUNTER:     

Subsequent                                        

 

ACUITY:     

3 days      

 

PAIN SCORE:     

Non-responsive.

 

LOCATION:     

Bilateral chest 

 

FINDINGS:     

Mild streaky infiltrate right base with small pleural effusion not significantly changed. Left lung c
lear. No pneumothorax.

 

Heart size stable, normal.

 

Endotracheal tube tip is about 4 cm above the ekaterina. Nasogastric tube courses into the stomach. Ther
e is a left subclavian central venous catheter with tip the atriocaval junction.

 

CONCLUSION:     No significant change.

 

 

 

 Waldemar Corrales MD on April 28, 2017 at 4:44           

Board Certified Radiologist.

 This report was verified electronically.
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
## 2017-04-29 VITALS
SYSTOLIC BLOOD PRESSURE: 151 MMHG | HEART RATE: 100 BPM | OXYGEN SATURATION: 100 % | RESPIRATION RATE: 22 BRPM | DIASTOLIC BLOOD PRESSURE: 56 MMHG | TEMPERATURE: 100.6 F

## 2017-04-29 VITALS
TEMPERATURE: 99 F | OXYGEN SATURATION: 99 % | DIASTOLIC BLOOD PRESSURE: 50 MMHG | RESPIRATION RATE: 16 BRPM | HEART RATE: 91 BPM | SYSTOLIC BLOOD PRESSURE: 129 MMHG

## 2017-04-29 VITALS — OXYGEN SATURATION: 100 %

## 2017-04-29 VITALS
SYSTOLIC BLOOD PRESSURE: 138 MMHG | OXYGEN SATURATION: 100 % | HEART RATE: 96 BPM | DIASTOLIC BLOOD PRESSURE: 52 MMHG | TEMPERATURE: 99.3 F | RESPIRATION RATE: 16 BRPM

## 2017-04-29 VITALS — OXYGEN SATURATION: 98 %

## 2017-04-29 VITALS
SYSTOLIC BLOOD PRESSURE: 153 MMHG | HEART RATE: 95 BPM | TEMPERATURE: 99.9 F | OXYGEN SATURATION: 100 % | DIASTOLIC BLOOD PRESSURE: 60 MMHG | RESPIRATION RATE: 22 BRPM

## 2017-04-29 VITALS
SYSTOLIC BLOOD PRESSURE: 146 MMHG | TEMPERATURE: 100.4 F | RESPIRATION RATE: 22 BRPM | HEART RATE: 102 BPM | DIASTOLIC BLOOD PRESSURE: 57 MMHG | OXYGEN SATURATION: 100 %

## 2017-04-29 VITALS
DIASTOLIC BLOOD PRESSURE: 58 MMHG | RESPIRATION RATE: 22 BRPM | TEMPERATURE: 101.7 F | SYSTOLIC BLOOD PRESSURE: 138 MMHG | HEART RATE: 102 BPM | OXYGEN SATURATION: 100 %

## 2017-04-29 VITALS
TEMPERATURE: 99.5 F | SYSTOLIC BLOOD PRESSURE: 136 MMHG | RESPIRATION RATE: 16 BRPM | HEART RATE: 97 BPM | DIASTOLIC BLOOD PRESSURE: 58 MMHG | OXYGEN SATURATION: 100 %

## 2017-04-29 VITALS
SYSTOLIC BLOOD PRESSURE: 142 MMHG | HEART RATE: 96 BPM | TEMPERATURE: 99.3 F | RESPIRATION RATE: 22 BRPM | OXYGEN SATURATION: 100 % | DIASTOLIC BLOOD PRESSURE: 54 MMHG

## 2017-04-29 VITALS — HEART RATE: 107 BPM

## 2017-04-29 VITALS — HEART RATE: 101 BPM

## 2017-04-29 VITALS
SYSTOLIC BLOOD PRESSURE: 152 MMHG | OXYGEN SATURATION: 100 % | DIASTOLIC BLOOD PRESSURE: 59 MMHG | HEART RATE: 98 BPM | RESPIRATION RATE: 22 BRPM | TEMPERATURE: 99.9 F

## 2017-04-29 VITALS — HEART RATE: 92 BPM

## 2017-04-29 VITALS — HEART RATE: 96 BPM

## 2017-04-29 VITALS — HEART RATE: 94 BPM

## 2017-04-29 LAB
ANION GAP SERPL CALC-SCNC: 4 MEQ/L (ref 5–15)
BASOPHILS # BLD AUTO: 0 TH/MM3 (ref 0–0.2)
BASOPHILS NFR BLD: 0.2 % (ref 0–2)
BUN SERPL-MCNC: 9 MG/DL (ref 7–18)
CHLORIDE SERPL-SCNC: 121 MEQ/L (ref 98–107)
EOSINOPHIL # BLD: 0.3 TH/MM3 (ref 0–0.4)
EOSINOPHIL NFR BLD: 2.5 % (ref 0–4)
ERYTHROCYTE [DISTWIDTH] IN BLOOD BY AUTOMATED COUNT: 13.3 % (ref 11.6–17.2)
GFR SERPLBLD BASED ON 1.73 SQ M-ARVRAT: 239 ML/MIN (ref 89–?)
HCO3 BLD-SCNC: 25.7 MEQ/L (ref 21–32)
HCT VFR BLD CALC: 21 % (ref 39–51)
HCT VFR BLD CALC: 21.3 % (ref 39–51)
HCT VFR BLD CALC: 23.8 % (ref 39–51)
HEMO FLAGS: (no result)
LYMPHOCYTES # BLD AUTO: 1.3 TH/MM3 (ref 1–4.8)
LYMPHOCYTES NFR BLD AUTO: 13 % (ref 9–44)
MAGNESIUM SERPL-MCNC: 2.1 MG/DL (ref 1.5–2.5)
MCH RBC QN AUTO: 29.2 PG (ref 27–34)
MCHC RBC AUTO-ENTMCNC: 34 % (ref 32–36)
MCV RBC AUTO: 86 FL (ref 80–100)
MONOCYTES NFR BLD: 6.8 % (ref 0–8)
NEUTROPHILS # BLD AUTO: 8 TH/MM3 (ref 1.8–7.7)
NEUTROPHILS NFR BLD AUTO: 77.5 % (ref 16–70)
PLATELET # BLD: 193 TH/MM3 (ref 150–450)
POTASSIUM SERPL-SCNC: 3.6 MEQ/L (ref 3.5–5.1)
RBC # BLD AUTO: 2.44 MIL/MM3 (ref 4.5–5.9)
REVIEW FLAG: (no result)
REVIEW FLAG: (no result)
SODIUM SERPL-SCNC: 151 MEQ/L (ref 136–145)
WBC # BLD AUTO: 10.3 TH/MM3 (ref 4–11)

## 2017-04-29 RX ADMIN — IPRATROPIUM BROMIDE AND ALBUTEROL SULFATE SCH AMPULE: .5; 3 SOLUTION RESPIRATORY (INHALATION) at 20:16

## 2017-04-29 RX ADMIN — BACITRACIN SCH APPLIC: 500 OINTMENT TOPICAL at 21:14

## 2017-04-29 RX ADMIN — SODIUM CHLORIDE SCH MG: 900 INJECTION, SOLUTION INTRAVENOUS at 21:12

## 2017-04-29 RX ADMIN — INSULIN ASPART SCH: 100 INJECTION, SOLUTION INTRAVENOUS; SUBCUTANEOUS at 11:00

## 2017-04-29 RX ADMIN — Medication SCH MLS/HR: at 13:07

## 2017-04-29 RX ADMIN — INSULIN ASPART SCH: 100 INJECTION, SOLUTION INTRAVENOUS; SUBCUTANEOUS at 21:00

## 2017-04-29 RX ADMIN — SODIUM CHLORIDE SCH MLS/HR: 3 INJECTION, SOLUTION INTRAVENOUS at 13:07

## 2017-04-29 RX ADMIN — POLYVINYL ALCOHOL SCH DROP: 14 SOLUTION/ DROPS OPHTHALMIC at 17:32

## 2017-04-29 RX ADMIN — POLYVINYL ALCOHOL SCH DROP: 14 SOLUTION/ DROPS OPHTHALMIC at 08:34

## 2017-04-29 RX ADMIN — LEVETIRACETAM SCH MLS/HR: 100 INJECTION, SOLUTION, CONCENTRATE INTRAVENOUS at 09:49

## 2017-04-29 RX ADMIN — WATER SCH ML: 1 IRRIGANT IRRIGATION at 09:48

## 2017-04-29 RX ADMIN — CHLORHEXIDINE GLUCONATE 0.12% ORAL RINSE SCH ML: 1.2 LIQUID ORAL at 21:07

## 2017-04-29 RX ADMIN — SODIUM CHLORIDE SCH MLS/HR: 900 INJECTION INTRAVENOUS at 05:04

## 2017-04-29 RX ADMIN — PROPOFOL SCH MLS/HR: 10 INJECTION, EMULSION INTRAVENOUS at 18:25

## 2017-04-29 RX ADMIN — ACETAMINOPHEN PRN MG: 325 TABLET ORAL at 21:46

## 2017-04-29 RX ADMIN — Medication SCH MLS/HR: at 05:05

## 2017-04-29 RX ADMIN — PROPOFOL SCH MLS/HR: 10 INJECTION, EMULSION INTRAVENOUS at 17:31

## 2017-04-29 RX ADMIN — IPRATROPIUM BROMIDE AND ALBUTEROL SULFATE SCH AMPULE: .5; 3 SOLUTION RESPIRATORY (INHALATION) at 17:47

## 2017-04-29 RX ADMIN — LEVETIRACETAM SCH MLS/HR: 100 INJECTION, SOLUTION, CONCENTRATE INTRAVENOUS at 21:09

## 2017-04-29 RX ADMIN — Medication SCH ML: at 21:17

## 2017-04-29 RX ADMIN — PROPOFOL SCH MLS/HR: 10 INJECTION, EMULSION INTRAVENOUS at 13:07

## 2017-04-29 RX ADMIN — SENNOSIDES SCH MG: 8.6 TABLET, FILM COATED ORAL at 17:31

## 2017-04-29 RX ADMIN — DOCUSATE SODIUM SCH MG: 50 LIQUID ORAL at 21:08

## 2017-04-29 RX ADMIN — IPRATROPIUM BROMIDE AND ALBUTEROL SULFATE SCH AMPULE: .5; 3 SOLUTION RESPIRATORY (INHALATION) at 03:52

## 2017-04-29 RX ADMIN — FAMOTIDINE SCH MG: 10 INJECTION, SOLUTION INTRAVENOUS at 09:48

## 2017-04-29 RX ADMIN — INSULIN ASPART SCH: 100 INJECTION, SOLUTION INTRAVENOUS; SUBCUTANEOUS at 07:00

## 2017-04-29 RX ADMIN — SODIUM CHLORIDE SCH MG: 900 INJECTION, SOLUTION INTRAVENOUS at 13:36

## 2017-04-29 RX ADMIN — PROPOFOL SCH MLS/HR: 10 INJECTION, EMULSION INTRAVENOUS at 03:30

## 2017-04-29 RX ADMIN — IPRATROPIUM BROMIDE AND ALBUTEROL SULFATE SCH AMPULE: .5; 3 SOLUTION RESPIRATORY (INHALATION) at 09:06

## 2017-04-29 RX ADMIN — FAMOTIDINE SCH MG: 10 INJECTION, SOLUTION INTRAVENOUS at 21:11

## 2017-04-29 RX ADMIN — DOCUSATE SODIUM SCH MG: 50 LIQUID ORAL at 09:48

## 2017-04-29 RX ADMIN — CHLORHEXIDINE GLUCONATE SCH PACK: 500 CLOTH TOPICAL at 04:00

## 2017-04-29 RX ADMIN — MAGNESIUM HYDROXIDE SCH ML: 400 SUSPENSION ORAL at 21:17

## 2017-04-29 RX ADMIN — POLYVINYL ALCOHOL SCH DROP: 14 SOLUTION/ DROPS OPHTHALMIC at 11:54

## 2017-04-29 RX ADMIN — Medication SCH ML: at 08:34

## 2017-04-29 RX ADMIN — INSULIN ASPART SCH: 100 INJECTION, SOLUTION INTRAVENOUS; SUBCUTANEOUS at 15:59

## 2017-04-29 RX ADMIN — PROPOFOL SCH MLS/HR: 10 INJECTION, EMULSION INTRAVENOUS at 13:06

## 2017-04-29 RX ADMIN — BACITRACIN SCH APPLIC: 500 OINTMENT TOPICAL at 08:35

## 2017-04-29 RX ADMIN — CHLORHEXIDINE GLUCONATE 0.12% ORAL RINSE SCH ML: 1.2 LIQUID ORAL at 08:34

## 2017-04-29 NOTE — RADRPT
EXAM DATE/TIME:  04/29/2017 05:32 

 

[facility] COMPARISON:     

CHEST SINGLE AP, April 28, 2017, 3:32.

 

                     

INDICATIONS :     

Shortness of breath.

                     

 

MEDICAL HISTORY :     

None.          

 

SURGICAL HISTORY :     

None.   

 

ENCOUNTER:     

Subsequent                                        

 

ACUITY:     

4 - 6 days      

 

PAIN SCORE:     

Non-responsive.

 

LOCATION:     

Bilateral chest 

 

FINDINGS:     

There is mild atelectasis/infiltrate in the right mid lung, unchanged. Left lung remains clear. No la
rge effusion. No pneumothorax.

 

Cardiomediastinal silhouette normal.

 

Endotracheal tube tip is at the level of thoracic inlet. Nasogastric tube coiled in the stomach. Left
 subclavian central venous catheter with tip in the superior vena cava unchanged.

 

CONCLUSION:     

No change. Mild right midlung consolidation persists.

 

 

 

 Waldemar Corrales MD on April 29, 2017 at 6:55           

Board Certified Radiologist.

 This report was verified electronically.
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
## 2017-04-30 VITALS — OXYGEN SATURATION: 94 %

## 2017-04-30 VITALS
RESPIRATION RATE: 24 BRPM | SYSTOLIC BLOOD PRESSURE: 161 MMHG | DIASTOLIC BLOOD PRESSURE: 68 MMHG | HEART RATE: 108 BPM | OXYGEN SATURATION: 99 % | TEMPERATURE: 100.9 F

## 2017-04-30 VITALS
TEMPERATURE: 99.7 F | OXYGEN SATURATION: 97 % | DIASTOLIC BLOOD PRESSURE: 51 MMHG | RESPIRATION RATE: 24 BRPM | HEART RATE: 91 BPM | SYSTOLIC BLOOD PRESSURE: 139 MMHG

## 2017-04-30 VITALS — HEART RATE: 99 BPM

## 2017-04-30 VITALS
HEART RATE: 101 BPM | SYSTOLIC BLOOD PRESSURE: 135 MMHG | RESPIRATION RATE: 22 BRPM | DIASTOLIC BLOOD PRESSURE: 52 MMHG | TEMPERATURE: 100 F | OXYGEN SATURATION: 95 %

## 2017-04-30 VITALS — HEART RATE: 100 BPM

## 2017-04-30 VITALS
RESPIRATION RATE: 22 BRPM | OXYGEN SATURATION: 93 % | TEMPERATURE: 100.4 F | SYSTOLIC BLOOD PRESSURE: 116 MMHG | HEART RATE: 104 BPM | DIASTOLIC BLOOD PRESSURE: 54 MMHG

## 2017-04-30 VITALS
DIASTOLIC BLOOD PRESSURE: 54 MMHG | RESPIRATION RATE: 22 BRPM | HEART RATE: 102 BPM | SYSTOLIC BLOOD PRESSURE: 142 MMHG | OXYGEN SATURATION: 95 % | TEMPERATURE: 100.4 F

## 2017-04-30 VITALS — HEART RATE: 96 BPM

## 2017-04-30 VITALS — HEART RATE: 105 BPM

## 2017-04-30 VITALS
SYSTOLIC BLOOD PRESSURE: 155 MMHG | HEART RATE: 105 BPM | OXYGEN SATURATION: 100 % | TEMPERATURE: 101.3 F | RESPIRATION RATE: 22 BRPM | DIASTOLIC BLOOD PRESSURE: 55 MMHG

## 2017-04-30 VITALS — OXYGEN SATURATION: 100 %

## 2017-04-30 VITALS — HEART RATE: 115 BPM

## 2017-04-30 VITALS — HEART RATE: 98 BPM

## 2017-04-30 VITALS — OXYGEN SATURATION: 98 %

## 2017-04-30 VITALS — OXYGEN SATURATION: 99 %

## 2017-04-30 LAB
ALP SERPL-CCNC: 46 U/L (ref 45–117)
ALT SERPL-CCNC: 45 U/L (ref 9–52)
ANION GAP SERPL CALC-SCNC: 7 MEQ/L (ref 5–15)
AST SERPL-CCNC: 23 U/L (ref 15–39)
BASE EXCESS BLD CALC-SCNC: -0.5 MMOL/L (ref -2–2)
BASOPHILS # BLD AUTO: 0 TH/MM3 (ref 0–0.2)
BASOPHILS NFR BLD: 0.3 % (ref 0–2)
BENZODIAZEPINES PNL UR: 93 % (ref 90–100)
BILIRUB SERPL-MCNC: 0.9 MG/DL (ref 0.2–1)
BLOOD GAS CARBOXYHEMOGLOBIN: 1.2 % (ref 0–4)
BLOOD GAS HCO3: 23 MMOL/L (ref 22–26)
BLOOD GAS OXYGEN CONTENT: 11 VOL % (ref 12–20)
BLOOD GAS PCO2: 36 MMHG (ref 38–42)
BUN SERPL-MCNC: 6 MG/DL (ref 7–18)
CHLORIDE SERPL-SCNC: 118 MEQ/L (ref 98–107)
CRITICAL VALUE: NO
DRAW SITE: (no result)
EOSINOPHIL # BLD: 0.2 TH/MM3 (ref 0–0.4)
EOSINOPHIL NFR BLD: 1.3 % (ref 0–4)
ERYTHROCYTE [DISTWIDTH] IN BLOOD BY AUTOMATED COUNT: 18.2 % (ref 11.6–17.2)
GFR SERPLBLD BASED ON 1.73 SQ M-ARVRAT: 212 ML/MIN (ref 89–?)
HCO3 BLD-SCNC: 25.3 MEQ/L (ref 21–32)
HCT VFR BLD CALC: 24.1 % (ref 39–51)
HEMO FLAGS: (no result)
LYMPHOCYTES # BLD AUTO: 0.8 TH/MM3 (ref 1–4.8)
LYMPHOCYTES NFR BLD AUTO: 5.4 % (ref 9–44)
MAGNESIUM SERPL-MCNC: 1.9 MG/DL (ref 1.5–2.5)
MCH RBC QN AUTO: 27 PG (ref 27–34)
MCHC RBC AUTO-ENTMCNC: 33.1 % (ref 32–36)
MCV RBC AUTO: 81.6 FL (ref 80–100)
METHGB MFR BLDA: 0.9 % (ref 0–2)
MONOCYTES NFR BLD: 4.9 % (ref 0–8)
NEUTROPHILS # BLD AUTO: 12.4 TH/MM3 (ref 1.8–7.7)
NEUTROPHILS NFR BLD AUTO: 88.1 % (ref 16–70)
O2/TOTAL GAS SETTING VFR VENT: 35 %
OXYGEN DEVICE: (no result)
PLATELET # BLD: 239 TH/MM3 (ref 150–450)
PO2 BLD: 72 MMHG (ref 61–120)
POTASSIUM SERPL-SCNC: 3 MEQ/L (ref 3.5–5.1)
RBC # BLD AUTO: 2.96 MIL/MM3 (ref 4.5–5.9)
SALICYLATES SERPL-MCNC: 8.4 G/DL (ref 12–16)
SODIUM SERPL-SCNC: 150 MEQ/L (ref 136–145)
STAT: NO
TEMP CORR TO: 98.6
ULNAR PULSE: PRESENT
VENT SETTINGS: (no result)
WBC # BLD AUTO: 14.1 TH/MM3 (ref 4–11)

## 2017-04-30 RX ADMIN — SODIUM CHLORIDE SCH MLS/HR: 3 INJECTION, SOLUTION INTRAVENOUS at 16:54

## 2017-04-30 RX ADMIN — SODIUM CHLORIDE SCH MG: 900 INJECTION, SOLUTION INTRAVENOUS at 07:00

## 2017-04-30 RX ADMIN — PROPOFOL SCH MLS/HR: 10 INJECTION, EMULSION INTRAVENOUS at 01:18

## 2017-04-30 RX ADMIN — METOPROLOL TARTRATE SCH MG: 1 INJECTION, SOLUTION INTRAVENOUS at 11:57

## 2017-04-30 RX ADMIN — POLYVINYL ALCOHOL SCH DROP: 14 SOLUTION/ DROPS OPHTHALMIC at 16:54

## 2017-04-30 RX ADMIN — SODIUM CHLORIDE SCH MLS/HR: 900 INJECTION INTRAVENOUS at 20:29

## 2017-04-30 RX ADMIN — DOCUSATE SODIUM SCH MG: 50 LIQUID ORAL at 20:29

## 2017-04-30 RX ADMIN — ACETAMINOPHEN PRN MG: 325 TABLET ORAL at 16:53

## 2017-04-30 RX ADMIN — POLYVINYL ALCOHOL SCH DROP: 14 SOLUTION/ DROPS OPHTHALMIC at 09:57

## 2017-04-30 RX ADMIN — INSULIN ASPART SCH: 100 INJECTION, SOLUTION INTRAVENOUS; SUBCUTANEOUS at 21:00

## 2017-04-30 RX ADMIN — PROPOFOL SCH MLS/HR: 10 INJECTION, EMULSION INTRAVENOUS at 09:56

## 2017-04-30 RX ADMIN — LEVETIRACETAM SCH MLS/HR: 100 INJECTION, SOLUTION, CONCENTRATE INTRAVENOUS at 20:29

## 2017-04-30 RX ADMIN — Medication SCH ML: at 20:29

## 2017-04-30 RX ADMIN — ENALAPRILAT PRN MG: 1.25 INJECTION INTRAVENOUS at 22:08

## 2017-04-30 RX ADMIN — WATER SCH ML: 1 IRRIGANT IRRIGATION at 09:56

## 2017-04-30 RX ADMIN — SODIUM CHLORIDE SCH MLS/HR: 900 INJECTION INTRAVENOUS at 13:10

## 2017-04-30 RX ADMIN — INSULIN ASPART SCH: 100 INJECTION, SOLUTION INTRAVENOUS; SUBCUTANEOUS at 11:00

## 2017-04-30 RX ADMIN — BACITRACIN SCH APPLIC: 500 OINTMENT TOPICAL at 09:58

## 2017-04-30 RX ADMIN — SENNOSIDES SCH MG: 8.6 TABLET, FILM COATED ORAL at 07:00

## 2017-04-30 RX ADMIN — SENNOSIDES SCH MG: 8.6 TABLET, FILM COATED ORAL at 16:53

## 2017-04-30 RX ADMIN — FAMOTIDINE SCH MG: 10 INJECTION, SOLUTION INTRAVENOUS at 20:29

## 2017-04-30 RX ADMIN — METOPROLOL TARTRATE SCH MG: 1 INJECTION, SOLUTION INTRAVENOUS at 16:53

## 2017-04-30 RX ADMIN — INSULIN ASPART SCH: 100 INJECTION, SOLUTION INTRAVENOUS; SUBCUTANEOUS at 07:00

## 2017-04-30 RX ADMIN — METOPROLOL TARTRATE SCH MG: 1 INJECTION, SOLUTION INTRAVENOUS at 23:00

## 2017-04-30 RX ADMIN — IPRATROPIUM BROMIDE AND ALBUTEROL SULFATE SCH AMPULE: .5; 3 SOLUTION RESPIRATORY (INHALATION) at 20:17

## 2017-04-30 RX ADMIN — SODIUM CHLORIDE SCH MG: 900 INJECTION, SOLUTION INTRAVENOUS at 15:32

## 2017-04-30 RX ADMIN — MAGNESIUM HYDROXIDE SCH ML: 400 SUSPENSION ORAL at 20:29

## 2017-04-30 RX ADMIN — LEVOFLOXACIN SCH MLS/HR: 5 INJECTION, SOLUTION INTRAVENOUS at 16:52

## 2017-04-30 RX ADMIN — SODIUM CHLORIDE SCH MG: 900 INJECTION, SOLUTION INTRAVENOUS at 20:29

## 2017-04-30 RX ADMIN — PROPOFOL SCH MLS/HR: 10 INJECTION, EMULSION INTRAVENOUS at 13:10

## 2017-04-30 RX ADMIN — CHLORHEXIDINE GLUCONATE SCH PACK: 500 CLOTH TOPICAL at 04:00

## 2017-04-30 RX ADMIN — CHLORHEXIDINE GLUCONATE 0.12% ORAL RINSE SCH ML: 1.2 LIQUID ORAL at 09:57

## 2017-04-30 RX ADMIN — SODIUM CHLORIDE SCH MLS/HR: 900 INJECTION INTRAVENOUS at 04:59

## 2017-04-30 RX ADMIN — Medication SCH MLS/HR: at 16:52

## 2017-04-30 RX ADMIN — IPRATROPIUM BROMIDE AND ALBUTEROL SULFATE SCH AMPULE: .5; 3 SOLUTION RESPIRATORY (INHALATION) at 09:44

## 2017-04-30 RX ADMIN — IPRATROPIUM BROMIDE AND ALBUTEROL SULFATE SCH AMPULE: .5; 3 SOLUTION RESPIRATORY (INHALATION) at 03:59

## 2017-04-30 RX ADMIN — CEFEPIME SCH MLS/HR: 2 INJECTION, POWDER, FOR SOLUTION INTRAVENOUS at 15:32

## 2017-04-30 RX ADMIN — BACITRACIN SCH APPLIC: 500 OINTMENT TOPICAL at 20:29

## 2017-04-30 RX ADMIN — INSULIN ASPART SCH: 100 INJECTION, SOLUTION INTRAVENOUS; SUBCUTANEOUS at 16:00

## 2017-04-30 RX ADMIN — ACETAMINOPHEN PRN MG: 325 TABLET ORAL at 10:47

## 2017-04-30 RX ADMIN — Medication SCH MLS/HR: at 03:17

## 2017-04-30 RX ADMIN — IPRATROPIUM BROMIDE AND ALBUTEROL SULFATE SCH AMPULE: .5; 3 SOLUTION RESPIRATORY (INHALATION) at 15:58

## 2017-04-30 RX ADMIN — CHLORHEXIDINE GLUCONATE 0.12% ORAL RINSE SCH ML: 1.2 LIQUID ORAL at 20:00

## 2017-04-30 RX ADMIN — LEVETIRACETAM SCH MLS/HR: 100 INJECTION, SOLUTION, CONCENTRATE INTRAVENOUS at 09:58

## 2017-04-30 RX ADMIN — FAMOTIDINE SCH MG: 10 INJECTION, SOLUTION INTRAVENOUS at 09:56

## 2017-04-30 RX ADMIN — DOCUSATE SODIUM SCH MG: 50 LIQUID ORAL at 09:56

## 2017-04-30 RX ADMIN — POLYVINYL ALCOHOL SCH DROP: 14 SOLUTION/ DROPS OPHTHALMIC at 11:57

## 2017-04-30 RX ADMIN — Medication SCH ML: at 09:57

## 2017-04-30 RX ADMIN — CEFEPIME SCH MLS/HR: 2 INJECTION, POWDER, FOR SOLUTION INTRAVENOUS at 02:43

## 2017-04-30 RX ADMIN — PROPOFOL SCH MLS/HR: 10 INJECTION, EMULSION INTRAVENOUS at 05:02
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
## 2017-04-30 NOTE — RADRPT
EXAM DATE/TIME:  04/30/2017 05:30 

 

[facility] COMPARISON:     

No previous studies available for comparison.

 

                     

INDICATIONS :     

Follow up trauma. Respiratory status.

                     

 

MEDICAL HISTORY :     

None.          

 

SURGICAL HISTORY :     

Craniotomy.   

 

ENCOUNTER:     

Subsequent                                        

 

ACUITY:     

4 - 6 days      

 

PAIN SCORE:     

Non-responsive.

 

LOCATION:     

Bilateral chest 

 

FINDINGS:     

Right mid lung consolidation slightly worse. There is developing consolidation and probable small ple
ural effusion at the left lung base. No pneumothorax seen.

 

Heart size stable, normal.

 

Endotracheal tube tip is approximately 5 cm above the ekaterina. There is a nasogastric tube coiled in t
he stomach. Left subclavian central venous catheter unchanged, tip in the superior vena cava.

 

CONCLUSION:     

Worsening right midlung consolidation and developing left base consolidation.

 

 

 

 Waldemar Corrales MD on April 30, 2017 at 6:39           

Board Certified Radiologist.

 This report was verified electronically.
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
## 2017-05-01 VITALS
OXYGEN SATURATION: 100 % | TEMPERATURE: 101.3 F | RESPIRATION RATE: 24 BRPM | DIASTOLIC BLOOD PRESSURE: 59 MMHG | SYSTOLIC BLOOD PRESSURE: 165 MMHG | HEART RATE: 91 BPM

## 2017-05-01 VITALS — HEART RATE: 100 BPM

## 2017-05-01 VITALS
DIASTOLIC BLOOD PRESSURE: 74 MMHG | HEART RATE: 103 BPM | TEMPERATURE: 101.7 F | RESPIRATION RATE: 22 BRPM | OXYGEN SATURATION: 100 % | SYSTOLIC BLOOD PRESSURE: 177 MMHG

## 2017-05-01 VITALS
DIASTOLIC BLOOD PRESSURE: 68 MMHG | TEMPERATURE: 100.8 F | RESPIRATION RATE: 24 BRPM | SYSTOLIC BLOOD PRESSURE: 135 MMHG | OXYGEN SATURATION: 100 % | HEART RATE: 95 BPM

## 2017-05-01 VITALS — HEART RATE: 91 BPM

## 2017-05-01 VITALS — OXYGEN SATURATION: 99 %

## 2017-05-01 VITALS — HEART RATE: 110 BPM

## 2017-05-01 VITALS — HEART RATE: 108 BPM

## 2017-05-01 VITALS — OXYGEN SATURATION: 98 %

## 2017-05-01 VITALS — HEART RATE: 86 BPM

## 2017-05-01 VITALS
TEMPERATURE: 100 F | RESPIRATION RATE: 20 BRPM | DIASTOLIC BLOOD PRESSURE: 81 MMHG | OXYGEN SATURATION: 100 % | HEART RATE: 90 BPM | SYSTOLIC BLOOD PRESSURE: 174 MMHG

## 2017-05-01 VITALS
RESPIRATION RATE: 24 BRPM | HEART RATE: 99 BPM | SYSTOLIC BLOOD PRESSURE: 134 MMHG | TEMPERATURE: 99.4 F | DIASTOLIC BLOOD PRESSURE: 78 MMHG | OXYGEN SATURATION: 99 %

## 2017-05-01 VITALS
SYSTOLIC BLOOD PRESSURE: 148 MMHG | OXYGEN SATURATION: 100 % | RESPIRATION RATE: 24 BRPM | DIASTOLIC BLOOD PRESSURE: 71 MMHG | HEART RATE: 86 BPM | TEMPERATURE: 99.9 F

## 2017-05-01 VITALS — OXYGEN SATURATION: 100 %

## 2017-05-01 VITALS — HEART RATE: 87 BPM

## 2017-05-01 LAB
ALP SERPL-CCNC: 55 U/L (ref 45–117)
ALT SERPL-CCNC: 39 U/L (ref 9–52)
ANION GAP SERPL CALC-SCNC: 7 MEQ/L (ref 5–15)
AST SERPL-CCNC: 23 U/L (ref 15–39)
BASE EXCESS BLD CALC-SCNC: -0.6 MMOL/L (ref -2–2)
BASE EXCESS BLD CALC-SCNC: 0.5 MMOL/L (ref -2–2)
BASOPHILS # BLD AUTO: 0 TH/MM3 (ref 0–0.2)
BASOPHILS NFR BLD: 0.2 % (ref 0–2)
BENZODIAZEPINES PNL UR: 97 % (ref 90–100)
BENZODIAZEPINES PNL UR: 97 % (ref 90–100)
BILIRUB SERPL-MCNC: 0.8 MG/DL (ref 0.2–1)
BLOOD GAS CARBOXYHEMOGLOBIN: 1.2 % (ref 0–4)
BLOOD GAS CARBOXYHEMOGLOBIN: 1.3 % (ref 0–4)
BLOOD GAS HCO3: 22 MMOL/L (ref 22–26)
BLOOD GAS HCO3: 24 MMOL/L (ref 22–26)
BLOOD GAS OXYGEN CONTENT: 12 VOL % (ref 12–20)
BLOOD GAS OXYGEN CONTENT: 12.6 VOL % (ref 12–20)
BLOOD GAS PCO2: 30 MMHG (ref 38–42)
BLOOD GAS PCO2: 37 MMHG (ref 38–42)
BUN SERPL-MCNC: 8 MG/DL (ref 7–18)
CHLORIDE SERPL-SCNC: 114 MEQ/L (ref 98–107)
CRITICAL VALUE: NO
CRITICAL VALUE: NO
DRAW SITE: (no result)
DRAW SITE: (no result)
EOSINOPHIL # BLD: 0.2 TH/MM3 (ref 0–0.4)
EOSINOPHIL NFR BLD: 0.7 % (ref 0–4)
ERYTHROCYTE [DISTWIDTH] IN BLOOD BY AUTOMATED COUNT: 17.4 % (ref 11.6–17.2)
GFR SERPLBLD BASED ON 1.73 SQ M-ARVRAT: 246 ML/MIN (ref 89–?)
HCO3 BLD-SCNC: 25.2 MEQ/L (ref 21–32)
HCT VFR BLD CALC: 25.3 % (ref 39–51)
HEMO FLAGS: (no result)
LYMPHOCYTES # BLD AUTO: 1.2 TH/MM3 (ref 1–4.8)
LYMPHOCYTES NFR BLD AUTO: 5.9 % (ref 9–44)
MAGNESIUM SERPL-MCNC: 2.1 MG/DL (ref 1.5–2.5)
MCH RBC QN AUTO: 26.9 PG (ref 27–34)
MCHC RBC AUTO-ENTMCNC: 32.6 % (ref 32–36)
MCV RBC AUTO: 82.3 FL (ref 80–100)
METHGB MFR BLDA: 0.8 % (ref 0–2)
METHGB MFR BLDA: 1 % (ref 0–2)
MONOCYTES NFR BLD: 5.8 % (ref 0–8)
NEUTROPHILS # BLD AUTO: 18 TH/MM3 (ref 1.8–7.7)
NEUTROPHILS NFR BLD AUTO: 87.4 % (ref 16–70)
O2/TOTAL GAS SETTING VFR VENT: 40 %
O2/TOTAL GAS SETTING VFR VENT: 45 %
OXYGEN DEVICE: (no result)
OXYGEN DEVICE: (no result)
PLATELET # BLD: 288 TH/MM3 (ref 150–450)
PO2 BLD: 170 MMHG (ref 61–120)
PO2 BLD: 171 MMHG (ref 61–120)
POTASSIUM SERPL-SCNC: 3.5 MEQ/L (ref 3.5–5.1)
RBC # BLD AUTO: 3.07 MIL/MM3 (ref 4.5–5.9)
SALICYLATES SERPL-MCNC: 8.5 G/DL (ref 12–16)
SALICYLATES SERPL-MCNC: 8.9 G/DL (ref 12–16)
SODIUM SERPL-SCNC: 146 MEQ/L (ref 136–145)
STAT: NO
STAT: NO
TEMP CORR TO: 98.6
TEMP CORR TO: 98.6
VANCOMYCIN TROUGH SERPL-MCNC: 6 MCG/ML (ref 5–10)
VENT SETTINGS: (no result)
WBC # BLD AUTO: 20.6 TH/MM3 (ref 4–11)

## 2017-05-01 PROCEDURE — 0PS3XZZ REPOSITION CERVICAL VERTEBRA, EXTERNAL APPROACH: ICD-10-PCS | Performed by: NEUROLOGICAL SURGERY

## 2017-05-01 PROCEDURE — 2W60X0Z TRACTION OF HEAD USING TRACTION APPARATUS: ICD-10-PCS | Performed by: NEUROLOGICAL SURGERY

## 2017-05-01 RX ADMIN — INSULIN ASPART SCH: 100 INJECTION, SOLUTION INTRAVENOUS; SUBCUTANEOUS at 11:00

## 2017-05-01 RX ADMIN — ACETAMINOPHEN PRN MG: 325 TABLET ORAL at 14:06

## 2017-05-01 RX ADMIN — POTASSIUM CHLORIDE PRN MLS/HR: 400 INJECTION, SOLUTION INTRAVENOUS at 04:55

## 2017-05-01 RX ADMIN — SODIUM CHLORIDE SCH MLS/HR: 900 INJECTION INTRAVENOUS at 11:36

## 2017-05-01 RX ADMIN — WATER SCH ML: 1 IRRIGANT IRRIGATION at 08:25

## 2017-05-01 RX ADMIN — PROPOFOL SCH MLS/HR: 10 INJECTION, EMULSION INTRAVENOUS at 06:36

## 2017-05-01 RX ADMIN — SODIUM CHLORIDE SCH MG: 900 INJECTION, SOLUTION INTRAVENOUS at 04:56

## 2017-05-01 RX ADMIN — SENNOSIDES SCH MG: 8.6 TABLET, FILM COATED ORAL at 04:55

## 2017-05-01 RX ADMIN — BACITRACIN SCH APPLIC: 500 OINTMENT TOPICAL at 20:53

## 2017-05-01 RX ADMIN — CHLORHEXIDINE GLUCONATE 0.12% ORAL RINSE SCH ML: 1.2 LIQUID ORAL at 08:08

## 2017-05-01 RX ADMIN — FLUCONAZOLE SCH MLS/HR: 2 INJECTION INTRAVENOUS at 08:25

## 2017-05-01 RX ADMIN — POLYVINYL ALCOHOL SCH DROP: 14 SOLUTION/ DROPS OPHTHALMIC at 16:20

## 2017-05-01 RX ADMIN — DOCUSATE SODIUM SCH MG: 50 LIQUID ORAL at 08:22

## 2017-05-01 RX ADMIN — INSULIN ASPART SCH: 100 INJECTION, SOLUTION INTRAVENOUS; SUBCUTANEOUS at 21:00

## 2017-05-01 RX ADMIN — BACITRACIN SCH APPLIC: 500 OINTMENT TOPICAL at 08:26

## 2017-05-01 RX ADMIN — ENALAPRILAT PRN MG: 1.25 INJECTION INTRAVENOUS at 12:53

## 2017-05-01 RX ADMIN — INSULIN ASPART SCH: 100 INJECTION, SOLUTION INTRAVENOUS; SUBCUTANEOUS at 15:25

## 2017-05-01 RX ADMIN — CEFEPIME SCH MLS/HR: 2 INJECTION, POWDER, FOR SOLUTION INTRAVENOUS at 01:43

## 2017-05-01 RX ADMIN — SODIUM CHLORIDE SCH MLS/HR: 900 INJECTION INTRAVENOUS at 22:07

## 2017-05-01 RX ADMIN — Medication SCH MLS/HR: at 19:55

## 2017-05-01 RX ADMIN — POLYVINYL ALCOHOL SCH DROP: 14 SOLUTION/ DROPS OPHTHALMIC at 11:36

## 2017-05-01 RX ADMIN — Medication SCH ML: at 08:22

## 2017-05-01 RX ADMIN — LEVETIRACETAM SCH MLS/HR: 100 INJECTION, SOLUTION, CONCENTRATE INTRAVENOUS at 20:51

## 2017-05-01 RX ADMIN — DOCUSATE SODIUM SCH MG: 50 LIQUID ORAL at 20:51

## 2017-05-01 RX ADMIN — FAMOTIDINE SCH MG: 10 INJECTION, SOLUTION INTRAVENOUS at 08:26

## 2017-05-01 RX ADMIN — SODIUM CHLORIDE SCH MLS/HR: 900 INJECTION INTRAVENOUS at 04:10

## 2017-05-01 RX ADMIN — METOPROLOL TARTRATE SCH MG: 1 INJECTION, SOLUTION INTRAVENOUS at 11:36

## 2017-05-01 RX ADMIN — CHLORHEXIDINE GLUCONATE 0.12% ORAL RINSE SCH ML: 1.2 LIQUID ORAL at 20:50

## 2017-05-01 RX ADMIN — INSULIN ASPART SCH: 100 INJECTION, SOLUTION INTRAVENOUS; SUBCUTANEOUS at 04:56

## 2017-05-01 RX ADMIN — MAGNESIUM HYDROXIDE SCH ML: 400 SUSPENSION ORAL at 20:51

## 2017-05-01 RX ADMIN — SODIUM CHLORIDE SCH MG: 900 INJECTION, SOLUTION INTRAVENOUS at 12:52

## 2017-05-01 RX ADMIN — SODIUM CHLORIDE SCH MG: 900 INJECTION, SOLUTION INTRAVENOUS at 20:52

## 2017-05-01 RX ADMIN — PROPOFOL SCH MLS/HR: 10 INJECTION, EMULSION INTRAVENOUS at 01:43

## 2017-05-01 RX ADMIN — LEVOFLOXACIN SCH MLS/HR: 5 INJECTION, SOLUTION INTRAVENOUS at 15:49

## 2017-05-01 RX ADMIN — METOPROLOL TARTRATE SCH MG: 1 INJECTION, SOLUTION INTRAVENOUS at 16:21

## 2017-05-01 RX ADMIN — POLYVINYL ALCOHOL SCH DROP: 14 SOLUTION/ DROPS OPHTHALMIC at 08:07

## 2017-05-01 RX ADMIN — PROPOFOL SCH MLS/HR: 10 INJECTION, EMULSION INTRAVENOUS at 19:01

## 2017-05-01 RX ADMIN — LEVETIRACETAM SCH MLS/HR: 100 INJECTION, SOLUTION, CONCENTRATE INTRAVENOUS at 08:26

## 2017-05-01 RX ADMIN — CHLORHEXIDINE GLUCONATE SCH PACK: 500 CLOTH TOPICAL at 03:45

## 2017-05-01 RX ADMIN — Medication SCH ML: at 20:52

## 2017-05-01 RX ADMIN — SENNOSIDES SCH MG: 8.6 TABLET, FILM COATED ORAL at 16:21

## 2017-05-01 RX ADMIN — CEFEPIME SCH MLS/HR: 2 INJECTION, POWDER, FOR SOLUTION INTRAVENOUS at 12:51

## 2017-05-01 RX ADMIN — METOPROLOL TARTRATE SCH MG: 1 INJECTION, SOLUTION INTRAVENOUS at 06:36

## 2017-05-01 RX ADMIN — FAMOTIDINE SCH MG: 10 INJECTION, SOLUTION INTRAVENOUS at 20:52

## 2017-05-01 NOTE — RADRPT
EXAM DATE/TIME:  05/01/2017 16:55 

 

[facility] COMPARISON:     

CT CERVICAL SPINE W/O CONTRAST, April 26, 2017, 17:23.

 

                     

INDICATIONS :     

Post halo placement.

                     

 

MEDICAL HISTORY :     

None.          

 

SURGICAL HISTORY :     

None.   

 

ENCOUNTER:     

Subsequent                                        

 

ACUITY:     

4 - 6 days      

 

PAIN SCORE:     

Non-responsive.

 

LOCATION:     

Bilateral  neck.

 

FINDINGS:     

A single lateral view of the cervical spine was performed. The patient is a halo apparatus. The cervi
lewis spine alignment is satisfactory. The alignment of the dens is appropriate. Endotracheal tube and 
nasogastric tube are present.

 

CONCLUSION:     

Satisfactory cervical spine appearance

 

 

 

 Waldemar Conley MD on May 01, 2017 at 17:33           

Board Certified Radiologist.

 This report was verified electronically.
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
## 2017-05-01 NOTE — RADRPT
EXAM DATE/TIME:  05/01/2017 02:39 

 

[facility] COMPARISON:     

CHEST SINGLE AP, April 30, 2017, 5:30.

 

                     

INDICATIONS :     

Shortness of breath, possible pulmonary disease.

                     

 

MEDICAL HISTORY :     

None.          

 

SURGICAL HISTORY :     

Craniotomy.   

 

ENCOUNTER:     

Subsequent                                        

 

ACUITY:     

4 - 6 days      

 

PAIN SCORE:     

Non-responsive.

 

LOCATION:     

Bilateral chest 

 

FINDINGS:     

The cardiac silhouette is normal in transverse diameter. There is improving left lower lobe atelectas
is. The right lung is free of acute parenchymal opacity. No pleural effusions are identified. Support
 lines and tubes are in satisfactory position.

 

CONCLUSION:     

1. Resolving left basilar atelectasis versus pneumonia

 

 

 

 Samy Edwards MD on May 01, 2017 at 4:15           

Board Certified Radiologist.

 This report was verified electronically.
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
## 2017-05-01 NOTE — HHI.PR
Neuropsych


Progress Notes/Response to Tx


Contents of Sessions:  Level of Consciousness


Time with Patient:  15 minutes


Premorbid psychological status


Premorbid Cognitive, Emotional and Behavioral Status:  Unstable.  The patient 

has an unknown number of years of education and no real work history prior to 

this injury.  The patient has prior psychiatric difficulties, including 

reportedly bipolar disorder (reported by grandmother to staff but not 

independently corroborated).  Substance abuse history includes polysubstance 

dependence, in controlled environment.





Behavioral Reactions of Patient and Family/Support System:  Tenuous.    The 

patient apparently lives with his grandmother.  The patients family is 

experiencing ongoing issues of adjustment given the nature of the injury, and 

this aspect of recovery will require ongoing monitoring. 





Emotional/Behavioral Status of Patient and Family/Support System:  Unstable.  

Pertinent issues, if appropriate to this patients clinical care, are described 

in detail above.


Maximizing acute care outcome


It is recommended that the patient be monitored for emergent behavioral 

impulsivity as the medical condition evolves.  This patients neuropathological 

challenges may limit their rehabilitation potential going forward, and these 

challenges will require specialized therapeutic skills to maximize outcome.  

Additionally, the patients family is experiencing ongoing issues of adjustment 

given the traumatic nature of the injury, and they will benefit from ongoing 

psychological assistance.


Anticipated Problems


Ongoing areas of concern will include behavioral impulsivity, lack of insight 

and judgment, which is expected to improve with time and treatment.


Treatment Plan


This clinician will continue to follow with you throughout the course of this 

patients acute care treatment, and I will be available to meet with the patient

s family/support system to facilitate their understanding and the ongoing care 

of their family member.  The goals of neuropsychological intervention shall be 

both educational and supportive to the family/support system as is deemed 

clinically appropriate.


[place] Level:  II:General response-total assist


Impression


Young man with severe traumatic brain injury superimposed on underlying history 

of polysubstance dependence.


Diagnosis:  


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 

behavioral disturbance


Status:  Acute


(2) Polysubstance dependence in controlled environment


Status:  Acute


Progress Note Narrative


Ongoing follow-up of patient seen during trauma rounds.  This is day 5 post 

injury.  On sedation vacations, he reportedly follows x 4, and his ICPs are 

around 12.  Given airway issues, he will need a trach per trauma team, but he 

will also require a halo given his C2 fracture.  Otherwise, the patient is 

intubated and sedated.  He is functioning around [place] off sedation.  I 

have met the grandmother and am in the process of providing her a [facility] booklet on traumatic brain injuries in order to facilitate their 

knowledge.  I will continue to follow.








Ricky Knutson PhD May 1, 2017 12:46 pm
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
## 2017-05-01 NOTE — PD.OP
VIDYA Neely [id number] 


__________________________________________________





Operative Report


Date of Surgery:  May 1, 2017


Preoperative Diagnosis:  


Cervical C2 type I and type II displaced odontoid fracture; thoracic T3, T4 and 

T5 vertebral body fractures


Postoperative Diagnosis:  


Same


Procedure:


Closed reduction with manipulation of the C2 odontoid fracture; closed 

treatment of thoracic vertebral body fracture; HALO placement


Anesthesia:


Local with sedation


Surgeon:


Trey Hall M.D.


Assistant(s):


None


Operation and Findings:


Informed consent was obtained from the patient's grandmother and sister 

witnessed by the nurses.  Procedure undertaken at the bedside in the surgical 

intensive care unit with oxygen saturation and hemodynamic monitoring.  Patient 

was already intubated on ventilator support and propofol and fentanyl drips.  

Neck was maintained in a [place] J collar during the placement of the halo.  A 

bifrontal and occipital regions were then shaved and prepped with Betadine 

solution and infiltrated with 1% lidocaine with epinephrine solution avoiding 

the laceration sites.  The halo ring was in place with 2 frontal and occipital 

pins tightened to 8 pounds of torque pressure.  The cervical spine gentle 

manipulation undertaken to try to restore the C2 dens fracture relative the 

body with x-ray confirmation. The halo vest was then also placed in the ring 

connectors of the vest with rods and locked in place at 30 pounds of pressure 

at each connection maintaining a neutral neck position.  A final lateral 

cervical spine x-ray was obtained which confirmed good cervical spinal 

alignment.  Patient tolerated the procedure well without any complications or 

blood loss.








Trey Hall MD May 1, 2017 17:21

## 2017-05-01 NOTE — RADRPT
EXAM DATE/TIME:  05/01/2017 20:26 

 

[facility] COMPARISON:     

CHEST SINGLE AP, May 01, 2017, 2:39.

 

                     

INDICATIONS :     

Respiratory disease.

                     

 

MEDICAL HISTORY :     

None.          

 

SURGICAL HISTORY :     

None.   

 

ENCOUNTER:     

Subsequent                                        

 

ACUITY:     

1 week      

 

PAIN SCORE:     

Non-responsive.

 

LOCATION:     

Bilateral chest 

 

FINDINGS:     

Bilateral perihilar infiltrates are present, worsening on the right and new on the left. Small, bilat
eral pleural effusions are suspected. No pneumothorax seen.

 

Halo now present.

 

Endotracheal tube tip is approximately 5 cm above the ekaterina. There is a nasogastric tube coiled in t
he stomach. There is a left subclavian central venous catheter with tip in the superior vena cava.

 

CONCLUSION:     

Bilateral perihilar infiltrates, worse on the right and developing on the left. Halo placement. No si
gnificant change lines and tubes.

 

 

 

 Waldemar Corrales MD on May 01, 2017 at 20:48           

Board Certified Radiologist.

 This report was verified electronically.
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
## 2017-05-02 VITALS — HEART RATE: 82 BPM

## 2017-05-02 VITALS
HEART RATE: 82 BPM | OXYGEN SATURATION: 100 % | TEMPERATURE: 99.1 F | RESPIRATION RATE: 22 BRPM | SYSTOLIC BLOOD PRESSURE: 136 MMHG | DIASTOLIC BLOOD PRESSURE: 78 MMHG

## 2017-05-02 VITALS
HEART RATE: 91 BPM | SYSTOLIC BLOOD PRESSURE: 142 MMHG | TEMPERATURE: 100.2 F | OXYGEN SATURATION: 100 % | RESPIRATION RATE: 16 BRPM | DIASTOLIC BLOOD PRESSURE: 85 MMHG

## 2017-05-02 VITALS
RESPIRATION RATE: 15 BRPM | OXYGEN SATURATION: 100 % | TEMPERATURE: 99.5 F | DIASTOLIC BLOOD PRESSURE: 80 MMHG | HEART RATE: 109 BPM | SYSTOLIC BLOOD PRESSURE: 150 MMHG

## 2017-05-02 VITALS
OXYGEN SATURATION: 99 % | HEART RATE: 89 BPM | DIASTOLIC BLOOD PRESSURE: 86 MMHG | SYSTOLIC BLOOD PRESSURE: 140 MMHG | TEMPERATURE: 99.3 F | RESPIRATION RATE: 13 BRPM

## 2017-05-02 VITALS
RESPIRATION RATE: 20 BRPM | DIASTOLIC BLOOD PRESSURE: 86 MMHG | OXYGEN SATURATION: 100 % | HEART RATE: 106 BPM | SYSTOLIC BLOOD PRESSURE: 148 MMHG | TEMPERATURE: 101.7 F

## 2017-05-02 VITALS
RESPIRATION RATE: 22 BRPM | DIASTOLIC BLOOD PRESSURE: 77 MMHG | SYSTOLIC BLOOD PRESSURE: 126 MMHG | OXYGEN SATURATION: 100 % | TEMPERATURE: 101.8 F | HEART RATE: 90 BPM

## 2017-05-02 VITALS — HEART RATE: 88 BPM

## 2017-05-02 VITALS — OXYGEN SATURATION: 99 %

## 2017-05-02 VITALS — HEART RATE: 79 BPM

## 2017-05-02 VITALS — OXYGEN SATURATION: 100 %

## 2017-05-02 VITALS — OXYGEN SATURATION: 97 %

## 2017-05-02 LAB
ALP SERPL-CCNC: 74 U/L (ref 45–117)
ALT SERPL-CCNC: 33 U/L (ref 9–52)
ANION GAP SERPL CALC-SCNC: 9 MEQ/L (ref 5–15)
AST SERPL-CCNC: 26 U/L (ref 15–39)
BASOPHILS # BLD AUTO: 0 TH/MM3 (ref 0–0.2)
BASOPHILS NFR BLD: 0.1 % (ref 0–2)
BATH SALTS (MDPV) UR: (no result)
BILIRUB SERPL-MCNC: 0.8 MG/DL (ref 0.2–1)
BUN SERPL-MCNC: 9 MG/DL (ref 7–18)
CHLORIDE SERPL-SCNC: 108 MEQ/L (ref 98–107)
ECSTASY (MDMA) UR: (no result)
EOSINOPHIL # BLD: 0.1 TH/MM3 (ref 0–0.4)
EOSINOPHIL NFR BLD: 0.8 % (ref 0–4)
ERYTHROCYTE [DISTWIDTH] IN BLOOD BY AUTOMATED COUNT: 17.2 % (ref 11.6–17.2)
GABAPENTIN UR: (no result)
GFR SERPLBLD BASED ON 1.73 SQ M-ARVRAT: 267 ML/MIN (ref 89–?)
HCO3 BLD-SCNC: 24 MEQ/L (ref 21–32)
HCT VFR BLD CALC: 23.2 % (ref 39–51)
HEMO FLAGS: (no result)
HEROIN (6-ACETYLMORPHINE) UR: (no result)
HYDROMORPHONE U: (no result)
K2 SPICE UR: (no result)
LYMPHOCYTES # BLD AUTO: 1.8 TH/MM3 (ref 1–4.8)
LYMPHOCYTES NFR BLD AUTO: 9.9 % (ref 9–44)
MAGNESIUM SERPL-MCNC: 2.2 MG/DL (ref 1.5–2.5)
MCH RBC QN AUTO: 27.6 PG (ref 27–34)
MCHC RBC AUTO-ENTMCNC: 33.8 % (ref 32–36)
MCV RBC AUTO: 81.9 FL (ref 80–100)
METHADONE UR QL SCN: (no result)
MONOCYTES NFR BLD: 7.1 % (ref 0–8)
NEUTROPHILS # BLD AUTO: 14.7 TH/MM3 (ref 1.8–7.7)
NEUTROPHILS NFR BLD AUTO: 82.1 % (ref 16–70)
OXYCODONE (PERCODAN): (no result)
PCP UR-MCNC: (no result) UG/L
PLATELET # BLD: 358 TH/MM3 (ref 150–450)
POTASSIUM SERPL-SCNC: 3.5 MEQ/L (ref 3.5–5.1)
RBC # BLD AUTO: 2.83 MIL/MM3 (ref 4.5–5.9)
SODIUM SERPL-SCNC: 141 MEQ/L (ref 136–145)
WBC # BLD AUTO: 18 TH/MM3 (ref 4–11)

## 2017-05-02 RX ADMIN — PROPOFOL SCH MLS/HR: 10 INJECTION, EMULSION INTRAVENOUS at 01:57

## 2017-05-02 RX ADMIN — LEVETIRACETAM SCH MLS/HR: 100 INJECTION, SOLUTION, CONCENTRATE INTRAVENOUS at 21:03

## 2017-05-02 RX ADMIN — POLYVINYL ALCOHOL SCH DROP: 14 SOLUTION/ DROPS OPHTHALMIC at 13:00

## 2017-05-02 RX ADMIN — DOCUSATE SODIUM SCH MG: 50 LIQUID ORAL at 07:59

## 2017-05-02 RX ADMIN — SODIUM CHLORIDE SCH MG: 900 INJECTION, SOLUTION INTRAVENOUS at 04:46

## 2017-05-02 RX ADMIN — BACITRACIN SCH APPLIC: 500 OINTMENT TOPICAL at 21:00

## 2017-05-02 RX ADMIN — SODIUM CHLORIDE SCH MLS/HR: 900 INJECTION INTRAVENOUS at 04:46

## 2017-05-02 RX ADMIN — Medication SCH MLS/HR: at 15:43

## 2017-05-02 RX ADMIN — CEFEPIME SCH MLS/HR: 2 INJECTION, POWDER, FOR SOLUTION INTRAVENOUS at 01:52

## 2017-05-02 RX ADMIN — CEFEPIME SCH MLS/HR: 2 INJECTION, POWDER, FOR SOLUTION INTRAVENOUS at 14:13

## 2017-05-02 RX ADMIN — SODIUM CHLORIDE SCH MG: 900 INJECTION, SOLUTION INTRAVENOUS at 21:08

## 2017-05-02 RX ADMIN — POLYVINYL ALCOHOL SCH DROP: 14 SOLUTION/ DROPS OPHTHALMIC at 18:00

## 2017-05-02 RX ADMIN — METOPROLOL TARTRATE SCH MG: 1 INJECTION, SOLUTION INTRAVENOUS at 01:58

## 2017-05-02 RX ADMIN — LEVOFLOXACIN SCH MLS/HR: 5 INJECTION, SOLUTION INTRAVENOUS at 17:32

## 2017-05-02 RX ADMIN — METOPROLOL TARTRATE SCH MG: 1 INJECTION, SOLUTION INTRAVENOUS at 11:39

## 2017-05-02 RX ADMIN — LEVETIRACETAM SCH MLS/HR: 100 INJECTION, SOLUTION, CONCENTRATE INTRAVENOUS at 09:19

## 2017-05-02 RX ADMIN — MAGNESIUM HYDROXIDE SCH ML: 400 SUSPENSION ORAL at 21:08

## 2017-05-02 RX ADMIN — DOCUSATE SODIUM SCH MG: 50 LIQUID ORAL at 21:07

## 2017-05-02 RX ADMIN — POTASSIUM CHLORIDE PRN MLS/HR: 400 INJECTION, SOLUTION INTRAVENOUS at 07:40

## 2017-05-02 RX ADMIN — FLUCONAZOLE SCH MLS/HR: 2 INJECTION INTRAVENOUS at 07:40

## 2017-05-02 RX ADMIN — INSULIN ASPART SCH: 100 INJECTION, SOLUTION INTRAVENOUS; SUBCUTANEOUS at 15:46

## 2017-05-02 RX ADMIN — INSULIN ASPART SCH: 100 INJECTION, SOLUTION INTRAVENOUS; SUBCUTANEOUS at 05:44

## 2017-05-02 RX ADMIN — SODIUM CHLORIDE SCH MG: 900 INJECTION, SOLUTION INTRAVENOUS at 14:13

## 2017-05-02 RX ADMIN — FAMOTIDINE SCH MG: 10 INJECTION, SOLUTION INTRAVENOUS at 09:19

## 2017-05-02 RX ADMIN — SENNOSIDES SCH MG: 8.6 TABLET, FILM COATED ORAL at 17:31

## 2017-05-02 RX ADMIN — Medication SCH ML: at 21:09

## 2017-05-02 RX ADMIN — ACETAMINOPHEN PRN MG: 325 TABLET ORAL at 15:48

## 2017-05-02 RX ADMIN — SODIUM CHLORIDE SCH MLS/HR: 900 INJECTION INTRAVENOUS at 11:39

## 2017-05-02 RX ADMIN — Medication SCH ML: at 09:00

## 2017-05-02 RX ADMIN — BACITRACIN SCH APPLIC: 500 OINTMENT TOPICAL at 09:00

## 2017-05-02 RX ADMIN — ACETAMINOPHEN PRN MG: 325 TABLET ORAL at 21:07

## 2017-05-02 RX ADMIN — INSULIN ASPART SCH: 100 INJECTION, SOLUTION INTRAVENOUS; SUBCUTANEOUS at 21:00

## 2017-05-02 RX ADMIN — SENNOSIDES SCH MG: 8.6 TABLET, FILM COATED ORAL at 04:47

## 2017-05-02 RX ADMIN — WATER SCH ML: 1 IRRIGANT IRRIGATION at 07:58

## 2017-05-02 RX ADMIN — CHLORHEXIDINE GLUCONATE SCH PACK: 500 CLOTH TOPICAL at 04:00

## 2017-05-02 RX ADMIN — METOPROLOL TARTRATE SCH MG: 1 INJECTION, SOLUTION INTRAVENOUS at 00:00

## 2017-05-02 RX ADMIN — METOPROLOL TARTRATE SCH MG: 1 INJECTION, SOLUTION INTRAVENOUS at 06:00

## 2017-05-02 RX ADMIN — LINEZOLID SCH MLS/HR: 600 INJECTION, SOLUTION INTRAVENOUS at 18:37

## 2017-05-02 RX ADMIN — METOPROLOL TARTRATE SCH MG: 1 INJECTION, SOLUTION INTRAVENOUS at 04:47

## 2017-05-02 RX ADMIN — CHLORHEXIDINE GLUCONATE 0.12% ORAL RINSE SCH ML: 1.2 LIQUID ORAL at 07:40

## 2017-05-02 RX ADMIN — CHLORHEXIDINE GLUCONATE 0.12% ORAL RINSE SCH ML: 1.2 LIQUID ORAL at 21:12

## 2017-05-02 RX ADMIN — METOPROLOL TARTRATE SCH MG: 1 INJECTION, SOLUTION INTRAVENOUS at 17:32

## 2017-05-02 RX ADMIN — POLYVINYL ALCOHOL SCH DROP: 14 SOLUTION/ DROPS OPHTHALMIC at 09:00

## 2017-05-02 RX ADMIN — FAMOTIDINE SCH MG: 10 INJECTION, SOLUTION INTRAVENOUS at 21:08

## 2017-05-02 RX ADMIN — INSULIN ASPART SCH: 100 INJECTION, SOLUTION INTRAVENOUS; SUBCUTANEOUS at 11:00
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
## 2017-05-02 NOTE — HHI.PR
Neuropsych


Progress Notes/Response to Tx


Contents of Sessions:  Level of Consciousness


Time with Patient:  15 minutes


Premorbid psychological status


Premorbid Cognitive, Emotional and Behavioral Status:  Unstable.  The patient 

has an unknown number of years of education and no real work history prior to 

this injury.  The patient has prior psychiatric difficulties, including 

reportedly bipolar disorder (reported by grandmother to staff but not 

independently corroborated).  Substance abuse history includes polysubstance 

dependence, in controlled environment.





Behavioral Reactions of Patient and Family/Support System:  Tenuous.    The 

patient apparently lives with his grandmother.  The patients family is 

experiencing ongoing issues of adjustment given the nature of the injury, and 

this aspect of recovery will require ongoing monitoring. 





Emotional/Behavioral Status of Patient and Family/Support System:  Unstable.  

Pertinent issues, if appropriate to this patients clinical care, are described 

in detail above.


Maximizing acute care outcome


It is recommended that the patient be monitored for emergent behavioral 

impulsivity as the medical condition evolves.  This patients neuropathological 

challenges may limit their rehabilitation potential going forward, and these 

challenges will require specialized therapeutic skills to maximize outcome.  

Additionally, the patients family is experiencing ongoing issues of adjustment 

given the traumatic nature of the injury, and they will benefit from ongoing 

psychological assistance.


Anticipated Problems


Ongoing areas of concern will include behavioral impulsivity, lack of insight 

and judgment, which is expected to improve with time and treatment.


Treatment Plan


This clinician will continue to follow with you throughout the course of this 

patients acute care treatment, and I will be available to meet with the patient

s family/support system to facilitate their understanding and the ongoing care 

of their family member.  The goals of neuropsychological intervention shall be 

both educational and supportive to the family/support system as is deemed 

clinically appropriate.


[facility] Level:  II:General response-total assist


Impression


Young man with severe traumatic brain injury superimposed on underlying history 

of polysubstance dependence.


Diagnosis:  


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 

behavioral disturbance


Status:  Acute


(2) Polysubstance dependence in controlled environment


Status:  Acute


Progress Note Narrative


Ongoing follow-up of patient seen during daily trauma rounds.  This is day 6 

post injury.  The patient opens his eyes and localizes x 4 on sedation 

vacations.  Tracheostomy has been postponed, as he has shown significant 

overall improvement.  This patient appears as a [place] II emerging III.  I will 

continue to follow.








Ricky Knutson PhD May 2, 2017 12:12

## 2017-05-02 NOTE — RADRPT
EXAM DATE/TIME:  05/02/2017 04:57 

 

[facility] COMPARISON:     

CHEST SINGLE AP, May 01, 2017, 20:26.

 

                     

INDICATIONS :     

Short of breath.

                     

 

MEDICAL HISTORY :     

None.          

 

SURGICAL HISTORY :     

None.   

 

ENCOUNTER:     

Subsequent                                        

 

ACUITY:     

1 week      

 

PAIN SCORE:     

Non-responsive.

 

LOCATION:     

Bilateral chest 

 

FINDINGS:     

The cardiac silhouette is normal in transverse diameter. Support lines and tubes are in satisfactory 
position. There is improving perihilar edema. No pleural effusions are identified.

 

CONCLUSION:     

1. Decreasing perihilar pulmonary edema.

 

 

 

 Samy Edwards MD on May 02, 2017 at 5:38           

Board Certified Radiologist.

 This report was verified electronically.
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
## 2017-05-03 VITALS
TEMPERATURE: 99.1 F | SYSTOLIC BLOOD PRESSURE: 141 MMHG | RESPIRATION RATE: 10 BRPM | OXYGEN SATURATION: 100 % | DIASTOLIC BLOOD PRESSURE: 81 MMHG | HEART RATE: 74 BPM

## 2017-05-03 VITALS — HEART RATE: 65 BPM

## 2017-05-03 VITALS — OXYGEN SATURATION: 100 %

## 2017-05-03 VITALS
SYSTOLIC BLOOD PRESSURE: 148 MMHG | RESPIRATION RATE: 14 BRPM | DIASTOLIC BLOOD PRESSURE: 83 MMHG | OXYGEN SATURATION: 100 % | HEART RATE: 87 BPM | TEMPERATURE: 99.3 F

## 2017-05-03 VITALS
OXYGEN SATURATION: 100 % | HEART RATE: 88 BPM | DIASTOLIC BLOOD PRESSURE: 88 MMHG | RESPIRATION RATE: 14 BRPM | SYSTOLIC BLOOD PRESSURE: 187 MMHG | TEMPERATURE: 100.3 F

## 2017-05-03 VITALS — HEART RATE: 82 BPM

## 2017-05-03 VITALS
RESPIRATION RATE: 13 BRPM | SYSTOLIC BLOOD PRESSURE: 138 MMHG | TEMPERATURE: 100.7 F | OXYGEN SATURATION: 100 % | HEART RATE: 74 BPM | DIASTOLIC BLOOD PRESSURE: 76 MMHG

## 2017-05-03 VITALS
DIASTOLIC BLOOD PRESSURE: 64 MMHG | SYSTOLIC BLOOD PRESSURE: 153 MMHG | RESPIRATION RATE: 18 BRPM | OXYGEN SATURATION: 100 % | HEART RATE: 78 BPM | TEMPERATURE: 100.2 F

## 2017-05-03 VITALS — HEART RATE: 64 BPM

## 2017-05-03 VITALS
HEART RATE: 72 BPM | SYSTOLIC BLOOD PRESSURE: 150 MMHG | RESPIRATION RATE: 13 BRPM | TEMPERATURE: 99.5 F | DIASTOLIC BLOOD PRESSURE: 78 MMHG | OXYGEN SATURATION: 99 %

## 2017-05-03 VITALS — HEART RATE: 60 BPM

## 2017-05-03 VITALS — HEART RATE: 79 BPM

## 2017-05-03 VITALS — HEART RATE: 78 BPM

## 2017-05-03 LAB
ALP SERPL-CCNC: 108 U/L (ref 45–117)
ALT SERPL-CCNC: 36 U/L (ref 9–52)
ANION GAP SERPL CALC-SCNC: 6 MEQ/L (ref 5–15)
AST SERPL-CCNC: 31 U/L (ref 15–39)
BASE EXCESS BLD CALC-SCNC: 2.3 MMOL/L (ref -2–2)
BASOPHILS # BLD AUTO: 0 TH/MM3 (ref 0–0.2)
BASOPHILS NFR BLD: 0.3 % (ref 0–2)
BENZODIAZEPINES PNL UR: 97 % (ref 90–100)
BILIRUB SERPL-MCNC: 0.7 MG/DL (ref 0.2–1)
BLOOD GAS CARBOXYHEMOGLOBIN: 1.2 % (ref 0–4)
BLOOD GAS HCO3: 27 MMOL/L (ref 22–26)
BLOOD GAS OXYGEN CONTENT: 16.1 VOL % (ref 12–20)
BLOOD GAS PCO2: 45 MMHG (ref 38–42)
BUN SERPL-MCNC: 9 MG/DL (ref 7–18)
CHLORIDE SERPL-SCNC: 103 MEQ/L (ref 98–107)
CRITICAL VALUE: NO
DRAW SITE: (no result)
EOSINOPHIL # BLD: 0.1 TH/MM3 (ref 0–0.4)
EOSINOPHIL NFR BLD: 0.7 % (ref 0–4)
ERYTHROCYTE [DISTWIDTH] IN BLOOD BY AUTOMATED COUNT: 16.5 % (ref 11.6–17.2)
GFR SERPLBLD BASED ON 1.73 SQ M-ARVRAT: 342 ML/MIN (ref 89–?)
HCO3 BLD-SCNC: 29.6 MEQ/L (ref 21–32)
HCT VFR BLD CALC: 26.1 % (ref 39–51)
HEMO FLAGS: (no result)
LYMPHOCYTES # BLD AUTO: 1.5 TH/MM3 (ref 1–4.8)
LYMPHOCYTES NFR BLD AUTO: 10 % (ref 9–44)
MAGNESIUM SERPL-MCNC: 2.2 MG/DL (ref 1.5–2.5)
MCH RBC QN AUTO: 27.1 PG (ref 27–34)
MCHC RBC AUTO-ENTMCNC: 32.8 % (ref 32–36)
MCV RBC AUTO: 82.6 FL (ref 80–100)
METHGB MFR BLDA: 0.9 % (ref 0–2)
MONOCYTES NFR BLD: 8.1 % (ref 0–8)
NEUTROPHILS # BLD AUTO: 12.4 TH/MM3 (ref 1.8–7.7)
NEUTROPHILS NFR BLD AUTO: 80.9 % (ref 16–70)
OXYGEN DEVICE: (no result)
PLATELET # BLD: 494 TH/MM3 (ref 150–450)
PO2 BLD: 154 MMHG (ref 61–120)
POTASSIUM SERPL-SCNC: 3.9 MEQ/L (ref 3.5–5.1)
RBC # BLD AUTO: 3.16 MIL/MM3 (ref 4.5–5.9)
SALICYLATES SERPL-MCNC: 11.6 G/DL (ref 12–16)
SODIUM SERPL-SCNC: 139 MEQ/L (ref 136–145)
STAT: NO
TEMP CORR TO: 98.6
VENT SETTINGS: (no result)
WBC # BLD AUTO: 15.3 TH/MM3 (ref 4–11)

## 2017-05-03 RX ADMIN — INSULIN ASPART SCH: 100 INJECTION, SOLUTION INTRAVENOUS; SUBCUTANEOUS at 05:49

## 2017-05-03 RX ADMIN — METOPROLOL TARTRATE SCH MG: 1 INJECTION, SOLUTION INTRAVENOUS at 05:03

## 2017-05-03 RX ADMIN — DOCUSATE SODIUM SCH MG: 50 LIQUID ORAL at 09:09

## 2017-05-03 RX ADMIN — METOPROLOL TARTRATE SCH MG: 1 INJECTION, SOLUTION INTRAVENOUS at 23:25

## 2017-05-03 RX ADMIN — FAMOTIDINE SCH MG: 10 INJECTION, SOLUTION INTRAVENOUS at 09:09

## 2017-05-03 RX ADMIN — CHLORHEXIDINE GLUCONATE SCH PACK: 500 CLOTH TOPICAL at 03:58

## 2017-05-03 RX ADMIN — WATER SCH ML: 1 IRRIGANT IRRIGATION at 09:09

## 2017-05-03 RX ADMIN — SODIUM CHLORIDE SCH MG: 900 INJECTION, SOLUTION INTRAVENOUS at 21:59

## 2017-05-03 RX ADMIN — BACITRACIN SCH APPLIC: 500 OINTMENT TOPICAL at 09:09

## 2017-05-03 RX ADMIN — LEVETIRACETAM SCH MLS/HR: 100 INJECTION, SOLUTION, CONCENTRATE INTRAVENOUS at 09:09

## 2017-05-03 RX ADMIN — Medication SCH ML: at 09:09

## 2017-05-03 RX ADMIN — ACETAMINOPHEN PRN MG: 325 TABLET ORAL at 20:17

## 2017-05-03 RX ADMIN — Medication SCH ML: at 20:18

## 2017-05-03 RX ADMIN — CHLORHEXIDINE GLUCONATE 0.12% ORAL RINSE SCH ML: 1.2 LIQUID ORAL at 08:15

## 2017-05-03 RX ADMIN — LINEZOLID SCH MLS/HR: 600 INJECTION, SOLUTION INTRAVENOUS at 17:25

## 2017-05-03 RX ADMIN — SENNOSIDES SCH MG: 8.6 TABLET, FILM COATED ORAL at 05:04

## 2017-05-03 RX ADMIN — LINEZOLID SCH MLS/HR: 600 INJECTION, SOLUTION INTRAVENOUS at 05:04

## 2017-05-03 RX ADMIN — METOPROLOL TARTRATE SCH MG: 1 INJECTION, SOLUTION INTRAVENOUS at 00:35

## 2017-05-03 RX ADMIN — SENNOSIDES SCH MG: 8.6 TABLET, FILM COATED ORAL at 17:25

## 2017-05-03 RX ADMIN — POLYVINYL ALCOHOL SCH DROP: 14 SOLUTION/ DROPS OPHTHALMIC at 13:17

## 2017-05-03 RX ADMIN — ENALAPRILAT PRN MG: 1.25 INJECTION INTRAVENOUS at 02:58

## 2017-05-03 RX ADMIN — POLYVINYL ALCOHOL SCH DROP: 14 SOLUTION/ DROPS OPHTHALMIC at 09:09

## 2017-05-03 RX ADMIN — DOCUSATE SODIUM SCH MG: 50 LIQUID ORAL at 20:17

## 2017-05-03 RX ADMIN — LEVETIRACETAM SCH MLS/HR: 100 INJECTION, SOLUTION, CONCENTRATE INTRAVENOUS at 20:17

## 2017-05-03 RX ADMIN — LEVOFLOXACIN SCH MLS/HR: 5 INJECTION, SOLUTION INTRAVENOUS at 16:30

## 2017-05-03 RX ADMIN — BACITRACIN SCH APPLIC: 500 OINTMENT TOPICAL at 20:18

## 2017-05-03 RX ADMIN — MAGNESIUM HYDROXIDE SCH ML: 400 SUSPENSION ORAL at 20:17

## 2017-05-03 RX ADMIN — METOPROLOL TARTRATE SCH MG: 1 INJECTION, SOLUTION INTRAVENOUS at 17:25

## 2017-05-03 RX ADMIN — METOPROLOL TARTRATE SCH MG: 1 INJECTION, SOLUTION INTRAVENOUS at 12:59

## 2017-05-03 RX ADMIN — SODIUM CHLORIDE SCH MG: 900 INJECTION, SOLUTION INTRAVENOUS at 13:47

## 2017-05-03 RX ADMIN — SODIUM CHLORIDE SCH MG: 900 INJECTION, SOLUTION INTRAVENOUS at 05:04

## 2017-05-03 RX ADMIN — CHLORHEXIDINE GLUCONATE 0.12% ORAL RINSE SCH ML: 1.2 LIQUID ORAL at 20:18

## 2017-05-03 RX ADMIN — FAMOTIDINE SCH MG: 10 INJECTION, SOLUTION INTRAVENOUS at 20:17

## 2017-05-03 RX ADMIN — POLYVINYL ALCOHOL SCH DROP: 14 SOLUTION/ DROPS OPHTHALMIC at 17:25

## 2017-05-03 RX ADMIN — Medication SCH MLS/HR: at 09:09

## 2017-05-03 RX ADMIN — ENALAPRILAT PRN MG: 1.25 INJECTION INTRAVENOUS at 20:17

## 2017-05-03 NOTE — RADRPT
EXAM DATE/TIME:  05/03/2017 18:04 

 

[facility] COMPARISON:     

No previous studies available for comparison.

        

 

 

INDICATIONS :                

Bilateral arm swelling. 

            

 

MEDICAL HISTORY :     

Irritable bowel syndrome.   Migraines. Bipolar. T3-5 and C6 fracture. Disc protrusion L4-5 and S1. Se
linus TGI. Cerebral edema. Subarachnoid hemorrhage. Respiratory failure. 

 

SURGICAL HISTORY :      

Ventirculostomy drain placement. 

 

ENCOUNTER:     

Initial

 

ACUITY:     

1 day

 

PAIN SCORE:      

Non-responsive

 

LOCATION:      

Bilateral  arm.

                       

 

FINDINGS:     

 

RIGHT UPPER EXTREMITY:     

Thrombus is present in the right axillary vein. There areas of occlusive and nonocclusive superficial
 venous thrombosis in the right proximal to distal basilic veins. The jugular vein, cephalic and axil
tray veins appear patent. The subclavian vein is patent as well. There is a complex cystic structure 
in the right axilla measuring 5.8 x 6.2 x 1.8 cm.

     

 

LEFT UPPER EXTREMITY:     

There is spontaneous flow documented in the brachial, basilic, cephalic, axillary, and subclavian vei
ns.  The vessels are compressible and augmentation response is documented.  No filling defects are se
en.  The flow is phasic with respiration.  Direction of flow in the jugular vein is caudal.  

 

CONCLUSION:     

1. Deep venous thrombosis in the right axillary vein.

2. Superficial venous thrombosis in the right basilic vein.

3. Complex fluid collection in the right axilla which is nonspecific but could represent hematoma and
/or seroma.

 

 

 

 Lake Perez MD on May 03, 2017 at 19:18           

Board Certified Radiologist.

 This report was verified electronically.

## 2017-05-03 NOTE — RADRPT
EXAM DATE/TIME:  05/03/2017 04:48 

 

[facility] COMPARISON:     

CHEST SINGLE AP, May 02, 2017, 4:57.

 

                     

INDICATIONS :     

Shortness of breath. 

                     

 

MEDICAL HISTORY :            

Non-responsive   

 

SURGICAL HISTORY :        

Non-responsive

 

ENCOUNTER:     

Subsequent                                        

 

ACUITY:     

1 week      

 

PAIN SCORE:     

Non-responsive.

 

LOCATION:     

Bilateral chest 

 

FINDINGS:     

A single view of the chest demonstrates the lungs to be symmetrically aerated without evidence of mas
s, infiltrate or effusion.  The cardiomediastinal contours are unremarkable.  Osseous structures are 
intact.

 

CONCLUSION:     

1. No acute cardiopulmonary disease.

 

 

 

 Samy Edwards MD on May 03, 2017 at 6:05           

Board Certified Radiologist.

 This report was verified electronically.
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
## 2017-05-03 NOTE — HHI.PR
Neuropsych


Progress Notes/Response to Tx


Contents of Sessions:  Level of Consciousness


Time with Patient:  15 minutes


Premorbid psychological status


Premorbid Cognitive, Emotional and Behavioral Status:  Unstable.  The patient 

has an unknown number of years of education and no real work history prior to 

this injury.  The patient has prior psychiatric difficulties, including 

reportedly bipolar disorder (reported by grandmother to staff but not 

independently corroborated).  Substance abuse history includes polysubstance 

dependence, in controlled environment.





Behavioral Reactions of Patient and Family/Support System:  Tenuous.    The 

patient apparently lives with his grandmother.  The patients family is 

experiencing ongoing issues of adjustment given the nature of the injury, and 

this aspect of recovery will require ongoing monitoring. 





Emotional/Behavioral Status of Patient and Family/Support System:  Unstable.  

Pertinent issues, if appropriate to this patients clinical care, are described 

in detail above.


Maximizing acute care outcome


It is recommended that the patient be monitored for emergent behavioral 

impulsivity as the medical condition evolves.  This patients neuropathological 

challenges may limit their rehabilitation potential going forward, and these 

challenges will require specialized therapeutic skills to maximize outcome.  

Additionally, the patients family is experiencing ongoing issues of adjustment 

given the traumatic nature of the injury, and they will benefit from ongoing 

psychological assistance.


Anticipated Problems


Ongoing areas of concern will include behavioral impulsivity, lack of insight 

and judgment, which is expected to improve with time and treatment.


Treatment Plan


This clinician will continue to follow with you throughout the course of this 

patients acute care treatment, and I will be available to meet with the patient

s family/support system to facilitate their understanding and the ongoing care 

of their family member.  The goals of neuropsychological intervention shall be 

both educational and supportive to the family/support system as is deemed 

clinically appropriate.


[facility] Level:  III:Localized response-total assist


Impression


Young man with severe traumatic brain injury superimposed on underlying history 

of polysubstance dependence.


Diagnosis:  


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 

behavioral disturbance


Status:  Acute


(2) Polysubstance dependence in controlled environment


Status:  Acute


Progress Note Narrative


Ongoing follow-up of patient seen during trauma rounds.  This is day 7 post 

injury.  The patient underwent closed reduction of C2 fracture and is in a 

Halo.  His ICPs increase to the 20s, and he follows x 4 on sedation vacation.  

He is an emerging [place] IV, but remains III for now.  No family present today.

  I will continue to follow.








Ricky Knutson PhD May 3, 2017 13:21
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
## 2017-05-04 VITALS
SYSTOLIC BLOOD PRESSURE: 136 MMHG | HEART RATE: 71 BPM | OXYGEN SATURATION: 95 % | DIASTOLIC BLOOD PRESSURE: 81 MMHG | RESPIRATION RATE: 14 BRPM | TEMPERATURE: 100 F

## 2017-05-04 VITALS — OXYGEN SATURATION: 100 %

## 2017-05-04 VITALS
HEART RATE: 108 BPM | RESPIRATION RATE: 15 BRPM | SYSTOLIC BLOOD PRESSURE: 150 MMHG | TEMPERATURE: 100.7 F | OXYGEN SATURATION: 98 % | DIASTOLIC BLOOD PRESSURE: 82 MMHG

## 2017-05-04 VITALS
TEMPERATURE: 100.1 F | HEART RATE: 72 BPM | OXYGEN SATURATION: 100 % | RESPIRATION RATE: 17 BRPM | SYSTOLIC BLOOD PRESSURE: 135 MMHG | DIASTOLIC BLOOD PRESSURE: 75 MMHG

## 2017-05-04 VITALS — HEART RATE: 65 BPM

## 2017-05-04 VITALS
SYSTOLIC BLOOD PRESSURE: 155 MMHG | RESPIRATION RATE: 14 BRPM | OXYGEN SATURATION: 100 % | TEMPERATURE: 100.8 F | HEART RATE: 78 BPM | DIASTOLIC BLOOD PRESSURE: 83 MMHG

## 2017-05-04 VITALS — OXYGEN SATURATION: 98 %

## 2017-05-04 VITALS
HEART RATE: 82 BPM | TEMPERATURE: 100.3 F | RESPIRATION RATE: 15 BRPM | SYSTOLIC BLOOD PRESSURE: 144 MMHG | OXYGEN SATURATION: 100 % | DIASTOLIC BLOOD PRESSURE: 77 MMHG

## 2017-05-04 VITALS — HEART RATE: 94 BPM

## 2017-05-04 VITALS — OXYGEN SATURATION: 97 %

## 2017-05-04 VITALS — OXYGEN SATURATION: 99 %

## 2017-05-04 VITALS
HEART RATE: 75 BPM | RESPIRATION RATE: 15 BRPM | DIASTOLIC BLOOD PRESSURE: 88 MMHG | SYSTOLIC BLOOD PRESSURE: 141 MMHG | TEMPERATURE: 100 F | OXYGEN SATURATION: 97 %

## 2017-05-04 VITALS — TEMPERATURE: 101.6 F

## 2017-05-04 VITALS — HEART RATE: 93 BPM

## 2017-05-04 VITALS — HEART RATE: 81 BPM

## 2017-05-04 VITALS — HEART RATE: 71 BPM

## 2017-05-04 LAB
ALP SERPL-CCNC: 70 U/L (ref 45–117)
ALT SERPL-CCNC: 32 U/L (ref 9–52)
ANION GAP SERPL CALC-SCNC: 9 MEQ/L (ref 5–15)
AST SERPL-CCNC: 29 U/L (ref 15–39)
BASE EXCESS BLD CALC-SCNC: 2.7 MMOL/L (ref -2–2)
BASOPHILS # BLD AUTO: 0 TH/MM3 (ref 0–0.2)
BASOPHILS NFR BLD: 0.3 % (ref 0–2)
BENZODIAZEPINES PNL UR: 97 % (ref 90–100)
BILIRUB SERPL-MCNC: 1 MG/DL (ref 0.2–1)
BLOOD GAS CARBOXYHEMOGLOBIN: 1.5 % (ref 0–4)
BLOOD GAS HCO3: 26 MMOL/L (ref 22–26)
BLOOD GAS OXYGEN CONTENT: 13.6 VOL % (ref 12–20)
BLOOD GAS PCO2: 39 MMHG (ref 38–42)
BUN SERPL-MCNC: 11 MG/DL (ref 7–18)
CHLORIDE SERPL-SCNC: 100 MEQ/L (ref 98–107)
CRITICAL VALUE: NO
DRAW SITE: (no result)
EOSINOPHIL # BLD: 0.1 TH/MM3 (ref 0–0.4)
EOSINOPHIL NFR BLD: 0.7 % (ref 0–4)
ERYTHROCYTE [DISTWIDTH] IN BLOOD BY AUTOMATED COUNT: 16.5 % (ref 11.6–17.2)
GFR SERPLBLD BASED ON 1.73 SQ M-ARVRAT: 228 ML/MIN (ref 89–?)
HCO3 BLD-SCNC: 28.6 MEQ/L (ref 21–32)
HCT VFR BLD CALC: 26.9 % (ref 39–51)
HEMO FLAGS: (no result)
LYMPHOCYTES # BLD AUTO: 1.2 TH/MM3 (ref 1–4.8)
LYMPHOCYTES NFR BLD AUTO: 8.5 % (ref 9–44)
MAGNESIUM SERPL-MCNC: 2.2 MG/DL (ref 1.5–2.5)
MCH RBC QN AUTO: 28.1 PG (ref 27–34)
MCHC RBC AUTO-ENTMCNC: 34.7 % (ref 32–36)
MCV RBC AUTO: 81 FL (ref 80–100)
METHGB MFR BLDA: 0.7 % (ref 0–2)
MONOCYTES NFR BLD: 7.8 % (ref 0–8)
NEUTROPHILS # BLD AUTO: 11.4 TH/MM3 (ref 1.8–7.7)
NEUTROPHILS NFR BLD AUTO: 82.7 % (ref 16–70)
NEUTS BAND # BLD MANUAL: 12.4 TH/MM3 (ref 1.8–7.7)
NEUTS BAND NFR BLD: 20 % (ref 0–6)
NEUTS SEG NFR BLD MANUAL: 70 % (ref 16–70)
NUMBER OF ARTERIAL PUNCTURES: 1
O2/TOTAL GAS SETTING VFR VENT: 35 %
OXYGEN DEVICE: (no result)
PLAT MORPH BLD: NORMAL
PLATELET # BLD: 653 TH/MM3 (ref 150–450)
PLATELET BLD QL SMEAR: (no result)
PO2 BLD: 128 MMHG (ref 61–120)
POTASSIUM SERPL-SCNC: 3.4 MEQ/L (ref 3.5–5.1)
RBC # BLD AUTO: 3.32 MIL/MM3 (ref 4.5–5.9)
SALICYLATES SERPL-MCNC: 9.8 G/DL (ref 12–16)
SCAN/DIFF: (no result)
SODIUM SERPL-SCNC: 138 MEQ/L (ref 136–145)
STAT: YES
TEMP CORR TO: 98.6
VENT SETTINGS: (no result)
WBC # BLD AUTO: 13.8 TH/MM3 (ref 4–11)
WBC DIFF SAMPLE: 100
WBC NRBC COR # BLD: 3 /100 WBC (ref 0–0)

## 2017-05-04 RX ADMIN — POLYVINYL ALCOHOL SCH DROP: 14 SOLUTION/ DROPS OPHTHALMIC at 17:44

## 2017-05-04 RX ADMIN — FAMOTIDINE SCH MG: 10 INJECTION, SOLUTION INTRAVENOUS at 20:14

## 2017-05-04 RX ADMIN — DOCUSATE SODIUM SCH MG: 50 LIQUID ORAL at 08:22

## 2017-05-04 RX ADMIN — CHLORHEXIDINE GLUCONATE SCH PACK: 500 CLOTH TOPICAL at 04:30

## 2017-05-04 RX ADMIN — BACITRACIN SCH APPLIC: 500 OINTMENT TOPICAL at 08:22

## 2017-05-04 RX ADMIN — BACITRACIN SCH APPLIC: 500 OINTMENT TOPICAL at 20:14

## 2017-05-04 RX ADMIN — WATER SCH ML: 1 IRRIGANT IRRIGATION at 08:22

## 2017-05-04 RX ADMIN — POTASSIUM CHLORIDE PRN MLS/HR: 200 INJECTION, SOLUTION INTRAVENOUS at 05:19

## 2017-05-04 RX ADMIN — METOPROLOL TARTRATE SCH MG: 1 INJECTION, SOLUTION INTRAVENOUS at 17:32

## 2017-05-04 RX ADMIN — LEVETIRACETAM SCH MLS/HR: 100 INJECTION, SOLUTION, CONCENTRATE INTRAVENOUS at 20:14

## 2017-05-04 RX ADMIN — ACETAMINOPHEN PRN MG: 325 TABLET ORAL at 01:42

## 2017-05-04 RX ADMIN — Medication SCH ML: at 08:22

## 2017-05-04 RX ADMIN — MORPHINE SULFATE PRN MG: 2 INJECTION, SOLUTION INTRAMUSCULAR; INTRAVENOUS at 15:43

## 2017-05-04 RX ADMIN — MORPHINE SULFATE PRN MG: 2 INJECTION, SOLUTION INTRAMUSCULAR; INTRAVENOUS at 19:30

## 2017-05-04 RX ADMIN — POTASSIUM CHLORIDE PRN MLS/HR: 200 INJECTION, SOLUTION INTRAVENOUS at 08:23

## 2017-05-04 RX ADMIN — METOPROLOL TARTRATE SCH MG: 1 INJECTION, SOLUTION INTRAVENOUS at 11:43

## 2017-05-04 RX ADMIN — MAGNESIUM HYDROXIDE SCH ML: 400 SUSPENSION ORAL at 20:14

## 2017-05-04 RX ADMIN — Medication SCH ML: at 20:15

## 2017-05-04 RX ADMIN — CHLORHEXIDINE GLUCONATE 0.12% ORAL RINSE SCH ML: 1.2 LIQUID ORAL at 20:01

## 2017-05-04 RX ADMIN — SENNOSIDES SCH MG: 8.6 TABLET, FILM COATED ORAL at 06:16

## 2017-05-04 RX ADMIN — SODIUM CHLORIDE SCH MG: 900 INJECTION, SOLUTION INTRAVENOUS at 13:32

## 2017-05-04 RX ADMIN — SODIUM CHLORIDE SCH MG: 900 INJECTION, SOLUTION INTRAVENOUS at 20:14

## 2017-05-04 RX ADMIN — Medication SCH MLS/HR: at 08:21

## 2017-05-04 RX ADMIN — LINEZOLID SCH MLS/HR: 600 INJECTION, SOLUTION INTRAVENOUS at 06:16

## 2017-05-04 RX ADMIN — ENALAPRILAT PRN MG: 1.25 INJECTION INTRAVENOUS at 03:03

## 2017-05-04 RX ADMIN — POLYVINYL ALCOHOL SCH DROP: 14 SOLUTION/ DROPS OPHTHALMIC at 12:40

## 2017-05-04 RX ADMIN — LEVETIRACETAM SCH MLS/HR: 100 INJECTION, SOLUTION, CONCENTRATE INTRAVENOUS at 08:21

## 2017-05-04 RX ADMIN — SENNOSIDES SCH MG: 8.6 TABLET, FILM COATED ORAL at 17:45

## 2017-05-04 RX ADMIN — DOCUSATE SODIUM SCH MG: 50 LIQUID ORAL at 20:14

## 2017-05-04 RX ADMIN — POLYVINYL ALCOHOL SCH DROP: 14 SOLUTION/ DROPS OPHTHALMIC at 08:22

## 2017-05-04 RX ADMIN — METOPROLOL TARTRATE SCH MG: 1 INJECTION, SOLUTION INTRAVENOUS at 06:17

## 2017-05-04 RX ADMIN — FAMOTIDINE SCH MG: 10 INJECTION, SOLUTION INTRAVENOUS at 08:22

## 2017-05-04 RX ADMIN — CHLORHEXIDINE GLUCONATE 0.12% ORAL RINSE SCH ML: 1.2 LIQUID ORAL at 08:22

## 2017-05-04 RX ADMIN — SODIUM CHLORIDE SCH MG: 900 INJECTION, SOLUTION INTRAVENOUS at 05:19

## 2017-05-04 RX ADMIN — METOPROLOL TARTRATE SCH MG: 1 INJECTION, SOLUTION INTRAVENOUS at 05:19
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
## 2017-05-04 NOTE — RADRPT
EXAM DATE/TIME:  05/04/2017 04:01 

 

[facility] COMPARISON:     

CHEST SINGLE AP, May 03, 2017, 4:48.

 

                     

INDICATIONS :     

Shortness of breath.

                     

 

MEDICAL HISTORY :            

Non-responsive.   

 

SURGICAL HISTORY :        

Non-responsive.

 

ENCOUNTER:     

Subsequent                                        

 

ACUITY:     

1 week      

 

PAIN SCORE:     

Non-responsive.

 

LOCATION:     

Bilateral chest 

 

FINDINGS:     

The cardiac silhouette is normal in transverse diameter. There is atelectasis in the right midlungSup
port lines and tubes are in satisfactory position. No pleural effusions are identified.

 

CONCLUSION:     

1. Subsegmental atelectasis right midlung

 

 

 

 Samy Edwards MD on May 04, 2017 at 4:49           

Board Certified Radiologist.

 This report was verified electronically.

## 2017-05-05 VITALS
TEMPERATURE: 102.7 F | RESPIRATION RATE: 24 BRPM | DIASTOLIC BLOOD PRESSURE: 97 MMHG | OXYGEN SATURATION: 100 % | HEART RATE: 122 BPM | SYSTOLIC BLOOD PRESSURE: 150 MMHG

## 2017-05-05 VITALS — OXYGEN SATURATION: 98 %

## 2017-05-05 VITALS — HEART RATE: 109 BPM

## 2017-05-05 VITALS
RESPIRATION RATE: 22 BRPM | TEMPERATURE: 100.6 F | DIASTOLIC BLOOD PRESSURE: 99 MMHG | SYSTOLIC BLOOD PRESSURE: 155 MMHG | OXYGEN SATURATION: 97 % | HEART RATE: 108 BPM

## 2017-05-05 VITALS
TEMPERATURE: 99.5 F | DIASTOLIC BLOOD PRESSURE: 99 MMHG | SYSTOLIC BLOOD PRESSURE: 149 MMHG | OXYGEN SATURATION: 98 % | RESPIRATION RATE: 22 BRPM | HEART RATE: 112 BPM

## 2017-05-05 VITALS
RESPIRATION RATE: 24 BRPM | SYSTOLIC BLOOD PRESSURE: 129 MMHG | TEMPERATURE: 99 F | DIASTOLIC BLOOD PRESSURE: 77 MMHG | HEART RATE: 105 BPM | OXYGEN SATURATION: 90 %

## 2017-05-05 VITALS — HEART RATE: 92 BPM

## 2017-05-05 VITALS — HEART RATE: 112 BPM

## 2017-05-05 VITALS
RESPIRATION RATE: 14 BRPM | TEMPERATURE: 99.8 F | HEART RATE: 100 BPM | SYSTOLIC BLOOD PRESSURE: 139 MMHG | DIASTOLIC BLOOD PRESSURE: 86 MMHG | OXYGEN SATURATION: 98 %

## 2017-05-05 VITALS
RESPIRATION RATE: 16 BRPM | SYSTOLIC BLOOD PRESSURE: 146 MMHG | DIASTOLIC BLOOD PRESSURE: 83 MMHG | HEART RATE: 113 BPM | TEMPERATURE: 100.4 F | OXYGEN SATURATION: 100 %

## 2017-05-05 VITALS — OXYGEN SATURATION: 99 %

## 2017-05-05 VITALS — HEART RATE: 99 BPM

## 2017-05-05 VITALS — HEART RATE: 126 BPM

## 2017-05-05 VITALS — OXYGEN SATURATION: 97 %

## 2017-05-05 VITALS — HEART RATE: 128 BPM

## 2017-05-05 LAB
ANION GAP SERPL CALC-SCNC: 12 MEQ/L (ref 5–15)
BASOPHILS # BLD AUTO: 0.1 TH/MM3 (ref 0–0.2)
BASOPHILS NFR BLD: 0.3 % (ref 0–2)
BUN SERPL-MCNC: 12 MG/DL (ref 7–18)
CHLORIDE SERPL-SCNC: 98 MEQ/L (ref 98–107)
EOSINOPHIL # BLD: 0.1 TH/MM3 (ref 0–0.4)
EOSINOPHIL NFR BLD: 0.3 % (ref 0–4)
ERYTHROCYTE [DISTWIDTH] IN BLOOD BY AUTOMATED COUNT: 16.3 % (ref 11.6–17.2)
GFR SERPLBLD BASED ON 1.73 SQ M-ARVRAT: 207 ML/MIN (ref 89–?)
GFR SERPLBLD BASED ON 1.73 SQ M-ARVRAT: 212 ML/MIN (ref 89–?)
HCO3 BLD-SCNC: 28.4 MEQ/L (ref 21–32)
HCT VFR BLD CALC: 31.8 % (ref 39–51)
HEMO FLAGS: (no result)
LYMPHOCYTES # BLD AUTO: 1.1 TH/MM3 (ref 1–4.8)
LYMPHOCYTES NFR BLD AUTO: 4.7 % (ref 9–44)
MCH RBC QN AUTO: 28.1 PG (ref 27–34)
MCHC RBC AUTO-ENTMCNC: 34.5 % (ref 32–36)
MCV RBC AUTO: 81.3 FL (ref 80–100)
MONOCYTES NFR BLD: 5.6 % (ref 0–8)
NEUTROPHILS # BLD AUTO: 20.8 TH/MM3 (ref 1.8–7.7)
NEUTROPHILS NFR BLD AUTO: 89.1 % (ref 16–70)
PLATELET # BLD: 915 TH/MM3 (ref 150–450)
POTASSIUM SERPL-SCNC: 3.8 MEQ/L (ref 3.5–5.1)
RBC # BLD AUTO: 3.91 MIL/MM3 (ref 4.5–5.9)
SODIUM SERPL-SCNC: 138 MEQ/L (ref 136–145)
WBC # BLD AUTO: 23.3 TH/MM3 (ref 4–11)

## 2017-05-05 RX ADMIN — FAMOTIDINE SCH MG: 20 TABLET, FILM COATED ORAL at 20:37

## 2017-05-05 RX ADMIN — MAGNESIUM HYDROXIDE SCH ML: 400 SUSPENSION ORAL at 20:32

## 2017-05-05 RX ADMIN — METOPROLOL TARTRATE SCH MG: 1 INJECTION, SOLUTION INTRAVENOUS at 23:14

## 2017-05-05 RX ADMIN — LEVETIRACETAM SCH MLS/HR: 100 INJECTION, SOLUTION, CONCENTRATE INTRAVENOUS at 08:03

## 2017-05-05 RX ADMIN — CHLORHEXIDINE GLUCONATE SCH PACK: 500 CLOTH TOPICAL at 04:00

## 2017-05-05 RX ADMIN — Medication SCH ML: at 08:03

## 2017-05-05 RX ADMIN — DOCUSATE SODIUM SCH MG: 50 LIQUID ORAL at 08:02

## 2017-05-05 RX ADMIN — WATER SCH ML: 1 IRRIGANT IRRIGATION at 08:02

## 2017-05-05 RX ADMIN — BACITRACIN SCH APPLIC: 500 OINTMENT TOPICAL at 20:37

## 2017-05-05 RX ADMIN — Medication SCH ML: at 20:33

## 2017-05-05 RX ADMIN — Medication SCH MLS/HR: at 08:26

## 2017-05-05 RX ADMIN — IPRATROPIUM BROMIDE AND ALBUTEROL SULFATE SCH AMPULE: .5; 3 SOLUTION RESPIRATORY (INHALATION) at 15:57

## 2017-05-05 RX ADMIN — SENNOSIDES SCH MG: 8.6 TABLET, FILM COATED ORAL at 05:44

## 2017-05-05 RX ADMIN — POLYVINYL ALCOHOL SCH DROP: 14 SOLUTION/ DROPS OPHTHALMIC at 18:00

## 2017-05-05 RX ADMIN — SENNOSIDES SCH MG: 8.6 TABLET, FILM COATED ORAL at 18:00

## 2017-05-05 RX ADMIN — METOPROLOL TARTRATE SCH MG: 1 INJECTION, SOLUTION INTRAVENOUS at 00:05

## 2017-05-05 RX ADMIN — SODIUM CHLORIDE SCH MG: 900 INJECTION, SOLUTION INTRAVENOUS at 20:37

## 2017-05-05 RX ADMIN — ENALAPRILAT PRN MG: 1.25 INJECTION INTRAVENOUS at 13:51

## 2017-05-05 RX ADMIN — SODIUM CHLORIDE SCH MG: 900 INJECTION, SOLUTION INTRAVENOUS at 05:44

## 2017-05-05 RX ADMIN — ACETAMINOPHEN PRN MG: 325 TABLET ORAL at 21:09

## 2017-05-05 RX ADMIN — FAMOTIDINE SCH MG: 10 INJECTION, SOLUTION INTRAVENOUS at 08:02

## 2017-05-05 RX ADMIN — METOPROLOL TARTRATE SCH MG: 1 INJECTION, SOLUTION INTRAVENOUS at 11:40

## 2017-05-05 RX ADMIN — CHLORHEXIDINE GLUCONATE 0.12% ORAL RINSE SCH ML: 1.2 LIQUID ORAL at 08:02

## 2017-05-05 RX ADMIN — MORPHINE SULFATE PRN MG: 2 INJECTION, SOLUTION INTRAMUSCULAR; INTRAVENOUS at 11:18

## 2017-05-05 RX ADMIN — METOPROLOL TARTRATE SCH MG: 1 INJECTION, SOLUTION INTRAVENOUS at 18:18

## 2017-05-05 RX ADMIN — BACITRACIN SCH APPLIC: 500 OINTMENT TOPICAL at 08:02

## 2017-05-05 RX ADMIN — METOPROLOL TARTRATE SCH MG: 1 INJECTION, SOLUTION INTRAVENOUS at 05:44

## 2017-05-05 RX ADMIN — IPRATROPIUM BROMIDE AND ALBUTEROL SULFATE SCH AMPULE: .5; 3 SOLUTION RESPIRATORY (INHALATION) at 20:50

## 2017-05-05 RX ADMIN — POLYVINYL ALCOHOL SCH DROP: 14 SOLUTION/ DROPS OPHTHALMIC at 13:00

## 2017-05-05 RX ADMIN — SODIUM CHLORIDE SCH MG: 900 INJECTION, SOLUTION INTRAVENOUS at 13:50

## 2017-05-05 RX ADMIN — POLYVINYL ALCOHOL SCH DROP: 14 SOLUTION/ DROPS OPHTHALMIC at 08:00
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
## 2017-05-05 NOTE — HHI.PR
Neuropsych


Progress Notes/Response to Tx


Contents of Sessions:  Level of Consciousness


Time with Patient:  15 minutes


Premorbid psychological status


Premorbid Cognitive, Emotional and Behavioral Status:  Unstable.  The patient 

has an unknown number of years of education and no real work history prior to 

this injury.  The patient has prior psychiatric difficulties, including 

reportedly bipolar disorder (reported by grandmother to staff but not 

independently corroborated).  Substance abuse history includes polysubstance 

dependence, in controlled environment.





Behavioral Reactions of Patient and Family/Support System:  Tenuous.    The 

patient apparently lives with his grandmother.  The patients family is 

experiencing ongoing issues of adjustment given the nature of the injury, and 

this aspect of recovery will require ongoing monitoring. 





Emotional/Behavioral Status of Patient and Family/Support System:  Unstable.  

Pertinent issues, if appropriate to this patients clinical care, are described 

in detail above.


Maximizing acute care outcome


It is recommended that the patient be monitored for emergent behavioral 

impulsivity as the medical condition evolves.  This patients neuropathological 

challenges may limit their rehabilitation potential going forward, and these 

challenges will require specialized therapeutic skills to maximize outcome.  

Additionally, the patients family is experiencing ongoing issues of adjustment 

given the traumatic nature of the injury, and they will benefit from ongoing 

psychological assistance.


Anticipated Problems


Ongoing areas of concern will include behavioral impulsivity, lack of insight 

and judgment, which is expected to improve with time and treatment.


Treatment Plan


This clinician will continue to follow with you throughout the course of this 

patients acute care treatment, and I will be available to meet with the patient

s family/support system to facilitate their understanding and the ongoing care 

of their family member.  The goals of neuropsychological intervention shall be 

both educational and supportive to the family/support system as is deemed 

clinically appropriate.


[place] Level:  IV:Confused/Agitated-maximal assist


Impression


Young man with severe traumatic brain injury superimposed on underlying history 

of polysubstance dependence.


Diagnosis:  


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 

behavioral disturbance


Status:  Acute


(2) Polysubstance dependence in controlled environment


Status:  Acute


Progress Note Narrative


Ongoing follow-up of patient seen during daily trauma rounds.  This is day 9 

post injury.  The patient is neurologically stable, he is awake and follows.  

He is on no propofol for sedation, and is on morphine and fentanyl for pain 

control.  He is in a MIN/LOW stim room.  He is rated as a Rancho IV due to 

restlessness at this stage of his recovery.  I will continue to follow.








Ricky Knutson PhD May 5, 2017 11:51

## 2017-05-06 VITALS
OXYGEN SATURATION: 100 % | DIASTOLIC BLOOD PRESSURE: 97 MMHG | RESPIRATION RATE: 24 BRPM | SYSTOLIC BLOOD PRESSURE: 150 MMHG | TEMPERATURE: 102.7 F | HEART RATE: 122 BPM

## 2017-05-06 VITALS
DIASTOLIC BLOOD PRESSURE: 81 MMHG | SYSTOLIC BLOOD PRESSURE: 143 MMHG | HEART RATE: 120 BPM | TEMPERATURE: 98.6 F | RESPIRATION RATE: 21 BRPM | OXYGEN SATURATION: 100 %

## 2017-05-06 VITALS
SYSTOLIC BLOOD PRESSURE: 144 MMHG | OXYGEN SATURATION: 100 % | HEART RATE: 112 BPM | DIASTOLIC BLOOD PRESSURE: 81 MMHG | TEMPERATURE: 100.9 F | RESPIRATION RATE: 16 BRPM

## 2017-05-06 VITALS
DIASTOLIC BLOOD PRESSURE: 66 MMHG | OXYGEN SATURATION: 100 % | HEART RATE: 94 BPM | RESPIRATION RATE: 16 BRPM | SYSTOLIC BLOOD PRESSURE: 129 MMHG | TEMPERATURE: 99.5 F

## 2017-05-06 VITALS
DIASTOLIC BLOOD PRESSURE: 79 MMHG | HEART RATE: 98 BPM | OXYGEN SATURATION: 100 % | RESPIRATION RATE: 18 BRPM | TEMPERATURE: 99.9 F | SYSTOLIC BLOOD PRESSURE: 140 MMHG

## 2017-05-06 VITALS
HEART RATE: 121 BPM | OXYGEN SATURATION: 100 % | RESPIRATION RATE: 18 BRPM | SYSTOLIC BLOOD PRESSURE: 148 MMHG | TEMPERATURE: 100.9 F | DIASTOLIC BLOOD PRESSURE: 80 MMHG

## 2017-05-06 VITALS — HEART RATE: 123 BPM

## 2017-05-06 VITALS — OXYGEN SATURATION: 100 %

## 2017-05-06 VITALS — HEART RATE: 84 BPM

## 2017-05-06 VITALS — HEART RATE: 130 BPM

## 2017-05-06 VITALS — HEART RATE: 112 BPM

## 2017-05-06 VITALS — HEART RATE: 97 BPM

## 2017-05-06 VITALS — HEART RATE: 85 BPM

## 2017-05-06 VITALS — OXYGEN SATURATION: 97 %

## 2017-05-06 LAB
ALP SERPL-CCNC: 125 U/L (ref 45–117)
ALT SERPL-CCNC: 76 U/L (ref 9–52)
ANION GAP SERPL CALC-SCNC: 12 MEQ/L (ref 5–15)
AST SERPL-CCNC: 43 U/L (ref 15–39)
BASOPHILS # BLD AUTO: 0.1 TH/MM3 (ref 0–0.2)
BASOPHILS NFR BLD AUTO: 1 % (ref 0–2)
BASOPHILS NFR BLD: 0.2 % (ref 0–2)
BILIRUB SERPL-MCNC: 1.8 MG/DL (ref 0.2–1)
BUN SERPL-MCNC: 23 MG/DL (ref 7–18)
CHLORIDE SERPL-SCNC: 98 MEQ/L (ref 98–107)
EOSINOPHIL # BLD: 0 TH/MM3 (ref 0–0.4)
EOSINOPHIL NFR BLD: 0 % (ref 0–4)
ERYTHROCYTE [DISTWIDTH] IN BLOOD BY AUTOMATED COUNT: 16.9 % (ref 11.6–17.2)
GFR SERPLBLD BASED ON 1.73 SQ M-ARVRAT: 169 ML/MIN (ref 89–?)
HCO3 BLD-SCNC: 28.9 MEQ/L (ref 21–32)
HCT VFR BLD CALC: 37.8 % (ref 39–51)
HEMO FLAGS: (no result)
LYMPHOCYTES # BLD AUTO: 1 TH/MM3 (ref 1–4.8)
LYMPHOCYTES NFR BLD AUTO: 3.1 % (ref 9–44)
MCH RBC QN AUTO: 27.1 PG (ref 27–34)
MCHC RBC AUTO-ENTMCNC: 32.9 % (ref 32–36)
MCV RBC AUTO: 82.4 FL (ref 80–100)
MONOCYTES NFR BLD: 4.4 % (ref 0–8)
MYELOCYTES NFR BLD: 1 % (ref 0–0)
NEUTROPHILS # BLD AUTO: 29.9 TH/MM3 (ref 1.8–7.7)
NEUTROPHILS NFR BLD AUTO: 92.3 % (ref 16–70)
NEUTS BAND # BLD MANUAL: 29.6 TH/MM3 (ref 1.8–7.7)
NEUTS SEG NFR BLD MANUAL: 90 % (ref 16–70)
PLAT MORPH BLD: NORMAL
PLATELET # BLD: 1239 TH/MM3 (ref 150–450)
PLATELET BLD QL SMEAR: (no result)
POTASSIUM SERPL-SCNC: 3.8 MEQ/L (ref 3.5–5.1)
RBC # BLD AUTO: 4.58 MIL/MM3 (ref 4.5–5.9)
RBC MORPH BLD: NORMAL
SCAN/DIFF: (no result)
SODIUM SERPL-SCNC: 139 MEQ/L (ref 136–145)
WBC # BLD AUTO: 32.5 TH/MM3 (ref 4–11)
WBC DIFF SAMPLE: 100

## 2017-05-06 RX ADMIN — MAGNESIUM HYDROXIDE SCH ML: 400 SUSPENSION ORAL at 21:00

## 2017-05-06 RX ADMIN — SODIUM CHLORIDE SCH MLS/HR: 900 INJECTION INTRAVENOUS at 18:10

## 2017-05-06 RX ADMIN — BACITRACIN SCH APPLIC: 500 OINTMENT TOPICAL at 08:01

## 2017-05-06 RX ADMIN — SENNOSIDES SCH MG: 8.6 TABLET, FILM COATED ORAL at 05:44

## 2017-05-06 RX ADMIN — BACITRACIN SCH APPLIC: 500 OINTMENT TOPICAL at 21:00

## 2017-05-06 RX ADMIN — IPRATROPIUM BROMIDE AND ALBUTEROL SULFATE SCH AMPULE: .5; 3 SOLUTION RESPIRATORY (INHALATION) at 03:24

## 2017-05-06 RX ADMIN — TAZOBACTAM SODIUM AND PIPERACILLIN SODIUM SCH MLS/HR: 500; 4 INJECTION, SOLUTION INTRAVENOUS at 18:10

## 2017-05-06 RX ADMIN — IPRATROPIUM BROMIDE AND ALBUTEROL SULFATE SCH AMPULE: .5; 3 SOLUTION RESPIRATORY (INHALATION) at 15:33

## 2017-05-06 RX ADMIN — Medication SCH ML: at 08:01

## 2017-05-06 RX ADMIN — TAZOBACTAM SODIUM AND PIPERACILLIN SODIUM SCH MLS/HR: 500; 4 INJECTION, SOLUTION INTRAVENOUS at 12:11

## 2017-05-06 RX ADMIN — ENOXAPARIN SODIUM SCH MG: 40 INJECTION SUBCUTANEOUS at 10:45

## 2017-05-06 RX ADMIN — WATER SCH ML: 1 IRRIGANT IRRIGATION at 08:27

## 2017-05-06 RX ADMIN — METOPROLOL TARTRATE SCH MG: 1 INJECTION, SOLUTION INTRAVENOUS at 18:10

## 2017-05-06 RX ADMIN — SODIUM CHLORIDE SCH MLS/HR: 900 INJECTION INTRAVENOUS at 10:36

## 2017-05-06 RX ADMIN — METOPROLOL TARTRATE SCH MG: 1 INJECTION, SOLUTION INTRAVENOUS at 12:11

## 2017-05-06 RX ADMIN — IPRATROPIUM BROMIDE AND ALBUTEROL SULFATE SCH AMPULE: .5; 3 SOLUTION RESPIRATORY (INHALATION) at 20:47

## 2017-05-06 RX ADMIN — ASPIRIN SCH MG: 325 TABLET ORAL at 10:45

## 2017-05-06 RX ADMIN — SODIUM CHLORIDE SCH MLS/HR: 900 INJECTION INTRAVENOUS at 10:44

## 2017-05-06 RX ADMIN — FAMOTIDINE SCH MG: 20 TABLET, FILM COATED ORAL at 21:00

## 2017-05-06 RX ADMIN — PHENYTOIN SODIUM SCH MLS/HR: 50 INJECTION INTRAMUSCULAR; INTRAVENOUS at 06:30

## 2017-05-06 RX ADMIN — POLYVINYL ALCOHOL SCH DROP: 14 SOLUTION/ DROPS OPHTHALMIC at 08:01

## 2017-05-06 RX ADMIN — METOPROLOL TARTRATE SCH MG: 1 INJECTION, SOLUTION INTRAVENOUS at 05:06

## 2017-05-06 RX ADMIN — POLYVINYL ALCOHOL SCH DROP: 14 SOLUTION/ DROPS OPHTHALMIC at 18:00

## 2017-05-06 RX ADMIN — PHENYTOIN SODIUM SCH MLS/HR: 50 INJECTION INTRAMUSCULAR; INTRAVENOUS at 17:56

## 2017-05-06 RX ADMIN — CHLORHEXIDINE GLUCONATE SCH PACK: 500 CLOTH TOPICAL at 03:50

## 2017-05-06 RX ADMIN — IPRATROPIUM BROMIDE AND ALBUTEROL SULFATE SCH AMPULE: .5; 3 SOLUTION RESPIRATORY (INHALATION) at 08:59

## 2017-05-06 RX ADMIN — FAMOTIDINE SCH MG: 20 TABLET, FILM COATED ORAL at 08:27

## 2017-05-06 RX ADMIN — SENNOSIDES SCH MG: 8.6 TABLET, FILM COATED ORAL at 18:00

## 2017-05-06 RX ADMIN — SODIUM CHLORIDE SCH MG: 900 INJECTION, SOLUTION INTRAVENOUS at 05:06

## 2017-05-06 RX ADMIN — POLYVINYL ALCOHOL SCH DROP: 14 SOLUTION/ DROPS OPHTHALMIC at 12:14

## 2017-05-06 RX ADMIN — ONDANSETRON PRN MG: 2 INJECTION, SOLUTION INTRAMUSCULAR; INTRAVENOUS at 08:55

## 2017-05-06 RX ADMIN — Medication SCH ML: at 21:00

## 2017-05-06 NOTE — RADRPT
EXAM DATE/TIME:  05/06/2017 16:51 

 

[facility] COMPARISON:     

No previous studies available for comparison.

 

 

INDICATIONS :     

Leukocytosis; fever, abdomen pain.

                      

 

IV CONTRAST:     

85 cc Omnipaque 350 (iohexol) IV 

 

 

ORAL CONTRAST:      

No oral contrast ingested.

                      

 

RADIATION DOSE:     

9.96 CTDIvol (mGy) 

 

 

MEDICAL HISTORY :     

None  

 

SURGICAL HISTORY :       

Cervical.

 

ENCOUNTER:      

Initial

 

ACUITY:      

1 day

 

PAIN SCALE:      

5/10

 

LOCATION:       

Bilateral neck 

 

TECHNIQUE:     

Volumetric scanning of the abdomen and pelvis was performed.  Using automated exposure control and ad
justment of the mA and/or kV according to patient size, radiation dose was kept as low as reasonably 
achievable to obtain optimal diagnostic quality images. 

 

FINDINGS:     

There is subsegmental basilar airspace disease most characteristic of atelectasis.

 

Mild fatty liver. Spleen, adrenals, kidneys and pancreas unremarkable. No calcified gallstones or subhash
iary ductal dilatation.

 

There is no free fluid. No free air. There is some subcutaneous air in anterior abdominal wall probab
ly from injections.

 

There is air within the bladder possibly from instrumentation. No loculated fluid within the abdomen 
or pelvis to suggest abscess formation.

 

CONCLUSION:     

1. Subsegmental airspace disease at the lung bases. Differential diagnosis includes atelectasis or mi
nimal pneumonic infiltrate.

2. No loculated fluid in the abdomen or pelvis to suggest abscess.

3. Air present in the anterior bladder which could be related to recent instrumentation. Mild fatty l
iver.

 

 

 

 Erick Sagastume MD on May 06, 2017 at 17:30           

Board Certified Radiologist.

 This report was verified electronically.
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
## 2017-05-06 NOTE — RADRPT
EXAM DATE/TIME:  05/06/2017 16:46 

 

[facility] COMPARISON:     

No previous studies available for comparison.

 

 

INDICATIONS :     

Evaluate for hemorrhage.

                      

 

RADIATION DOSE:     

56.35 CTDIvol (mGy) 

 

 

 

MEDICAL HISTORY :     

None  

 

SURGICAL HISTORY :      

None. 

 

ENCOUNTER:      

Initial

 

ACUITY:      

1 day

 

PAIN SCALE:      

3/10

 

LOCATION:        

cranial 

 

TECHNIQUE:     

Multiple contiguous axial images were obtained of the head.  Using automated exposure control and adj
ustment of the mA and/or kV according to patient size, radiation dose was kept as low as reasonably a
chievable to obtain optimal diagnostic quality images. 

 

FINDINGS:     

Comparison April 27. Previously described subarachnoid and intraventricular hemorrhage is less appare
nt on the current examination. Previous punctate hemorrhages in the basal ganglia have also resolved 
possibly with some mild residual encephalomalacia. Previous ventricular shunt has been removed. There
 is now fluid in the sphenoid sinus. A halo device present.

 

CONCLUSION:     

1. Overall improvement in subarachnoid, intraventricular and basal ganglia hemorrhage since April 27.
 No new hemorrhage or mass effect. Removal of ventriculostomy tube. No hydrocephalus. Fluid in spheno
id sinus.

 

 

 

 Erick Sagastume MD on May 06, 2017 at 17:27           

Board Certified Radiologist.

 This report was verified electronically.
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
## 2017-05-06 NOTE — RADRPT
EXAM DATE/TIME:  05/06/2017 10:35 

 

[facility] COMPARISON:     

CHEST SINGLE AP, May 03, 2017, 4:48.  CHEST SINGLE AP, May 04, 2017, 4:01.

 

                     

INDICATIONS :     

Cough starting today

                     

 

MEDICAL HISTORY :     

None.          

 

SURGICAL HISTORY :     

None.   

 

ENCOUNTER:     

Initial                                        

 

ACUITY:     

1 day      

 

PAIN SCORE:     

Non-responsive.

 

LOCATION:     

Bilateral chest 

 

FINDINGS:     

A single view of the chest demonstrates the lungs to be symmetrically aerated without evidence of mas
s, infiltrate or effusion.  There is stable atelectasis in the right perihilar region. The cardiomedi
astinal contours are unremarkable.  Osseous structures are intact. There is artifact from the halo tr
action device.

 

CONCLUSION: 

Stable atelectasis with no new infiltrates.

 

 

 

 Lake Perez MD on May 06, 2017 at 11:03           

Board Certified Radiologist.

 This report was verified electronically.

## 2017-05-06 NOTE — EC
Study

 

Study Date:05/06/2017

 

 

 

STUDY CONCLUSIONS

 

SUMMARY

 

LEFT VENTRICLE:

The cavity size was normal. Wall thickness was

normal. Systolic function was normal. The estimated ejection

fraction was in the range of 55% to 60%. Wall motion was normal;

there were no regional wall motion abnormalities.

Recommendations: Very poor acoustic window.

MICHELLE highly recomended

 

-------------------------------------------------------------------

If LV function is below 40, please consider prescribing an ACEI or

ARB or document rationale for non-use.

 

-------------------------------------------------------------------

PROCEDURE DATA

 

STUDY STATUS:

Elective. Procedure: Transthoracic echocardiography.

Image quality was poor. Scanning was performed from the

parasternal, apical, and subcostal acoustic windows. Study

completion: The patient tolerated the procedure well.

Transthoracic echocardiography. M-mode and limited 2D. Patient

status: Inpatient.

 

-------------------------------------------------------------------

CARDIAC ANATOMY

 

LEFT VENTRICLE:

The cavity size was normal. Wall thickness was

normal. Systolic function was normal. The estimated ejection

fraction was in the range of 55% to 60%. Wall motion was normal;

there were no regional wall motion abnormalities.

 

AORTIC VALVE:

Trileaflet; normal thickness leaflets. Doppler:

Transvalvular velocity was within the normal range. There was no

stenosis. No regurgitation.

 

AORTA:

Aortic root: The aortic root was normal in size.

 

LEFT ATRIUM:

The atrium was normal in size.

 

RIGHT VENTRICLE:

The cavity size was normal. Wall thickness was

normal.

 

PULMONIC VALVE:

Doppler: Transvalvular velocity was within the

normal range. There was no evidence for stenosis. No regurgitation.

 

PULMONARY ARTERY:

The main pulmonary artery was normal-sized.

Systolic pressure was within the normal range.

 

RIGHT ATRIUM:

The atrium was normal in size.

 

PERICARDIUM:

There was no pericardial effusion.

 

SYSTEMIC VEINS:

Inferior vena cava: The vessel was normal in size.

Prepared and signed by

 

Bart Kat

[id number]
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
## 2017-05-07 VITALS
OXYGEN SATURATION: 97 % | RESPIRATION RATE: 21 BRPM | TEMPERATURE: 99.8 F | SYSTOLIC BLOOD PRESSURE: 136 MMHG | HEART RATE: 120 BPM | DIASTOLIC BLOOD PRESSURE: 91 MMHG

## 2017-05-07 VITALS
DIASTOLIC BLOOD PRESSURE: 81 MMHG | SYSTOLIC BLOOD PRESSURE: 151 MMHG | OXYGEN SATURATION: 98 % | HEART RATE: 85 BPM | TEMPERATURE: 98.5 F | RESPIRATION RATE: 18 BRPM

## 2017-05-07 VITALS
SYSTOLIC BLOOD PRESSURE: 141 MMHG | RESPIRATION RATE: 13 BRPM | TEMPERATURE: 98.7 F | OXYGEN SATURATION: 100 % | DIASTOLIC BLOOD PRESSURE: 71 MMHG | HEART RATE: 90 BPM

## 2017-05-07 VITALS
OXYGEN SATURATION: 100 % | RESPIRATION RATE: 18 BRPM | SYSTOLIC BLOOD PRESSURE: 148 MMHG | HEART RATE: 85 BPM | TEMPERATURE: 99.1 F | DIASTOLIC BLOOD PRESSURE: 85 MMHG

## 2017-05-07 VITALS — OXYGEN SATURATION: 100 %

## 2017-05-07 VITALS — HEART RATE: 91 BPM

## 2017-05-07 VITALS
SYSTOLIC BLOOD PRESSURE: 152 MMHG | RESPIRATION RATE: 21 BRPM | OXYGEN SATURATION: 100 % | TEMPERATURE: 99.4 F | DIASTOLIC BLOOD PRESSURE: 84 MMHG | HEART RATE: 105 BPM

## 2017-05-07 VITALS
HEART RATE: 72 BPM | TEMPERATURE: 98.4 F | SYSTOLIC BLOOD PRESSURE: 158 MMHG | DIASTOLIC BLOOD PRESSURE: 91 MMHG | RESPIRATION RATE: 21 BRPM | OXYGEN SATURATION: 100 %

## 2017-05-07 VITALS — HEART RATE: 101 BPM

## 2017-05-07 VITALS — HEART RATE: 102 BPM

## 2017-05-07 VITALS — HEART RATE: 96 BPM

## 2017-05-07 VITALS — HEART RATE: 87 BPM

## 2017-05-07 LAB
ALP SERPL-CCNC: 92 U/L (ref 45–117)
ALT SERPL-CCNC: 55 U/L (ref 9–52)
ANION GAP SERPL CALC-SCNC: 9 MEQ/L (ref 5–15)
AST SERPL-CCNC: 39 U/L (ref 15–39)
BASOPHILS # BLD AUTO: 0.1 TH/MM3 (ref 0–0.2)
BASOPHILS NFR BLD: 0.4 % (ref 0–2)
BILIRUB SERPL-MCNC: 1.3 MG/DL (ref 0.2–1)
BUN SERPL-MCNC: 12 MG/DL (ref 7–18)
CHLORIDE SERPL-SCNC: 108 MEQ/L (ref 98–107)
EOSINOPHIL # BLD: 0.1 TH/MM3 (ref 0–0.4)
EOSINOPHIL NFR BLD: 0.5 % (ref 0–4)
ERYTHROCYTE [DISTWIDTH] IN BLOOD BY AUTOMATED COUNT: 17.2 % (ref 11.6–17.2)
GFR SERPLBLD BASED ON 1.73 SQ M-ARVRAT: 342 ML/MIN (ref 89–?)
HCO3 BLD-SCNC: 24.5 MEQ/L (ref 21–32)
HCT VFR BLD CALC: 29.2 % (ref 39–51)
HEMO FLAGS: (no result)
LYMPHOCYTES # BLD AUTO: 1.7 TH/MM3 (ref 1–4.8)
LYMPHOCYTES NFR BLD AUTO: 9.2 % (ref 9–44)
MCH RBC QN AUTO: 27 PG (ref 27–34)
MCHC RBC AUTO-ENTMCNC: 32.4 % (ref 32–36)
MCV RBC AUTO: 83.2 FL (ref 80–100)
MONOCYTES NFR BLD: 6 % (ref 0–8)
NEUTROPHILS # BLD AUTO: 15.1 TH/MM3 (ref 1.8–7.7)
NEUTROPHILS NFR BLD AUTO: 83.9 % (ref 16–70)
PLATELET # BLD: 956 TH/MM3 (ref 150–450)
POTASSIUM SERPL-SCNC: 3.6 MEQ/L (ref 3.5–5.1)
RBC # BLD AUTO: 3.51 MIL/MM3 (ref 4.5–5.9)
SODIUM SERPL-SCNC: 141 MEQ/L (ref 136–145)
WBC # BLD AUTO: 18 TH/MM3 (ref 4–11)

## 2017-05-07 RX ADMIN — PHENYTOIN SODIUM SCH MLS/HR: 50 INJECTION INTRAMUSCULAR; INTRAVENOUS at 03:56

## 2017-05-07 RX ADMIN — TAZOBACTAM SODIUM AND PIPERACILLIN SODIUM SCH MLS/HR: 500; 4 INJECTION, SOLUTION INTRAVENOUS at 00:04

## 2017-05-07 RX ADMIN — CHLORHEXIDINE GLUCONATE SCH PACK: 500 CLOTH TOPICAL at 04:00

## 2017-05-07 RX ADMIN — ONDANSETRON PRN MG: 2 INJECTION, SOLUTION INTRAMUSCULAR; INTRAVENOUS at 20:38

## 2017-05-07 RX ADMIN — TAZOBACTAM SODIUM AND PIPERACILLIN SODIUM SCH MLS/HR: 500; 4 INJECTION, SOLUTION INTRAVENOUS at 23:19

## 2017-05-07 RX ADMIN — PHENYTOIN SODIUM SCH MLS/HR: 50 INJECTION INTRAMUSCULAR; INTRAVENOUS at 12:06

## 2017-05-07 RX ADMIN — SENNOSIDES SCH MG: 8.6 TABLET, FILM COATED ORAL at 16:59

## 2017-05-07 RX ADMIN — METOPROLOL TARTRATE SCH MG: 1 INJECTION, SOLUTION INTRAVENOUS at 16:59

## 2017-05-07 RX ADMIN — IPRATROPIUM BROMIDE AND ALBUTEROL SULFATE SCH AMPULE: .5; 3 SOLUTION RESPIRATORY (INHALATION) at 16:16

## 2017-05-07 RX ADMIN — Medication SCH ML: at 20:35

## 2017-05-07 RX ADMIN — IPRATROPIUM BROMIDE AND ALBUTEROL SULFATE SCH AMPULE: .5; 3 SOLUTION RESPIRATORY (INHALATION) at 21:30

## 2017-05-07 RX ADMIN — WATER SCH ML: 1 IRRIGANT IRRIGATION at 07:53

## 2017-05-07 RX ADMIN — POLYVINYL ALCOHOL SCH DROP: 14 SOLUTION/ DROPS OPHTHALMIC at 07:51

## 2017-05-07 RX ADMIN — FAMOTIDINE SCH MG: 20 TABLET, FILM COATED ORAL at 20:35

## 2017-05-07 RX ADMIN — ENOXAPARIN SODIUM SCH MG: 40 INJECTION SUBCUTANEOUS at 10:24

## 2017-05-07 RX ADMIN — MAGNESIUM HYDROXIDE SCH ML: 400 SUSPENSION ORAL at 20:35

## 2017-05-07 RX ADMIN — TAZOBACTAM SODIUM AND PIPERACILLIN SODIUM SCH MLS/HR: 500; 4 INJECTION, SOLUTION INTRAVENOUS at 05:48

## 2017-05-07 RX ADMIN — SODIUM CHLORIDE SCH MLS/HR: 900 INJECTION INTRAVENOUS at 02:00

## 2017-05-07 RX ADMIN — SODIUM CHLORIDE SCH MLS/HR: 900 INJECTION INTRAVENOUS at 20:54

## 2017-05-07 RX ADMIN — POLYVINYL ALCOHOL SCH DROP: 14 SOLUTION/ DROPS OPHTHALMIC at 17:11

## 2017-05-07 RX ADMIN — MORPHINE SULFATE PRN MG: 2 INJECTION, SOLUTION INTRAMUSCULAR; INTRAVENOUS at 21:14

## 2017-05-07 RX ADMIN — METOPROLOL TARTRATE SCH MG: 1 INJECTION, SOLUTION INTRAVENOUS at 23:20

## 2017-05-07 RX ADMIN — BACITRACIN SCH APPLIC: 500 OINTMENT TOPICAL at 07:52

## 2017-05-07 RX ADMIN — BACITRACIN SCH APPLIC: 500 OINTMENT TOPICAL at 20:35

## 2017-05-07 RX ADMIN — SODIUM CHLORIDE SCH MLS/HR: 900 INJECTION INTRAVENOUS at 10:24

## 2017-05-07 RX ADMIN — Medication SCH ML: at 07:52

## 2017-05-07 RX ADMIN — IPRATROPIUM BROMIDE AND ALBUTEROL SULFATE SCH AMPULE: .5; 3 SOLUTION RESPIRATORY (INHALATION) at 09:41

## 2017-05-07 RX ADMIN — METOPROLOL TARTRATE SCH MG: 1 INJECTION, SOLUTION INTRAVENOUS at 05:48

## 2017-05-07 RX ADMIN — METOPROLOL TARTRATE SCH MG: 1 INJECTION, SOLUTION INTRAVENOUS at 00:04

## 2017-05-07 RX ADMIN — TAZOBACTAM SODIUM AND PIPERACILLIN SODIUM SCH MLS/HR: 500; 4 INJECTION, SOLUTION INTRAVENOUS at 16:59

## 2017-05-07 RX ADMIN — METOPROLOL TARTRATE SCH MG: 1 INJECTION, SOLUTION INTRAVENOUS at 12:06

## 2017-05-07 RX ADMIN — FAMOTIDINE SCH MG: 20 TABLET, FILM COATED ORAL at 07:52

## 2017-05-07 RX ADMIN — IPRATROPIUM BROMIDE AND ALBUTEROL SULFATE SCH AMPULE: .5; 3 SOLUTION RESPIRATORY (INHALATION) at 03:40

## 2017-05-07 RX ADMIN — PHENYTOIN SODIUM SCH MLS/HR: 50 INJECTION INTRAMUSCULAR; INTRAVENOUS at 23:16

## 2017-05-07 RX ADMIN — TAZOBACTAM SODIUM AND PIPERACILLIN SODIUM SCH MLS/HR: 500; 4 INJECTION, SOLUTION INTRAVENOUS at 12:06

## 2017-05-07 RX ADMIN — SENNOSIDES SCH MG: 8.6 TABLET, FILM COATED ORAL at 05:47

## 2017-05-07 RX ADMIN — SODIUM CHLORIDE SCH MLS/HR: 900 INJECTION INTRAVENOUS at 16:10

## 2017-05-07 RX ADMIN — ASPIRIN SCH MG: 325 TABLET ORAL at 07:52

## 2017-05-07 RX ADMIN — POLYVINYL ALCOHOL SCH DROP: 14 SOLUTION/ DROPS OPHTHALMIC at 12:06
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
## 2017-05-08 VITALS
TEMPERATURE: 100.6 F | DIASTOLIC BLOOD PRESSURE: 67 MMHG | OXYGEN SATURATION: 100 % | RESPIRATION RATE: 19 BRPM | SYSTOLIC BLOOD PRESSURE: 129 MMHG | HEART RATE: 100 BPM

## 2017-05-08 VITALS
OXYGEN SATURATION: 100 % | RESPIRATION RATE: 20 BRPM | DIASTOLIC BLOOD PRESSURE: 79 MMHG | SYSTOLIC BLOOD PRESSURE: 128 MMHG | HEART RATE: 101 BPM | TEMPERATURE: 99.6 F

## 2017-05-08 VITALS
TEMPERATURE: 98.4 F | RESPIRATION RATE: 12 BRPM | OXYGEN SATURATION: 97 % | DIASTOLIC BLOOD PRESSURE: 72 MMHG | SYSTOLIC BLOOD PRESSURE: 129 MMHG | HEART RATE: 103 BPM

## 2017-05-08 VITALS — OXYGEN SATURATION: 100 %

## 2017-05-08 VITALS
RESPIRATION RATE: 13 BRPM | SYSTOLIC BLOOD PRESSURE: 146 MMHG | DIASTOLIC BLOOD PRESSURE: 79 MMHG | HEART RATE: 107 BPM | TEMPERATURE: 98.5 F | OXYGEN SATURATION: 97 %

## 2017-05-08 VITALS
HEART RATE: 113 BPM | TEMPERATURE: 99.4 F | DIASTOLIC BLOOD PRESSURE: 59 MMHG | SYSTOLIC BLOOD PRESSURE: 128 MMHG | RESPIRATION RATE: 16 BRPM | OXYGEN SATURATION: 100 %

## 2017-05-08 VITALS
SYSTOLIC BLOOD PRESSURE: 133 MMHG | TEMPERATURE: 99 F | HEART RATE: 101 BPM | DIASTOLIC BLOOD PRESSURE: 85 MMHG | RESPIRATION RATE: 20 BRPM | OXYGEN SATURATION: 97 %

## 2017-05-08 LAB
ALP SERPL-CCNC: 102 U/L (ref 45–117)
ALT SERPL-CCNC: 73 U/L (ref 9–52)
ANION GAP SERPL CALC-SCNC: 9 MEQ/L (ref 5–15)
AST SERPL-CCNC: 52 U/L (ref 15–39)
BASOPHILS # BLD AUTO: 0.1 TH/MM3 (ref 0–0.2)
BASOPHILS NFR BLD: 0.5 % (ref 0–2)
BILIRUB SERPL-MCNC: 1.2 MG/DL (ref 0.2–1)
BUN SERPL-MCNC: 8 MG/DL (ref 7–18)
CHLORIDE SERPL-SCNC: 105 MEQ/L (ref 98–107)
EOSINOPHIL # BLD: 0.1 TH/MM3 (ref 0–0.4)
EOSINOPHIL NFR BLD: 0.9 % (ref 0–4)
ERYTHROCYTE [DISTWIDTH] IN BLOOD BY AUTOMATED COUNT: 17.2 % (ref 11.6–17.2)
GFR SERPLBLD BASED ON 1.73 SQ M-ARVRAT: 252 ML/MIN (ref 89–?)
HCO3 BLD-SCNC: 24.5 MEQ/L (ref 21–32)
HCT VFR BLD CALC: 28.4 % (ref 39–51)
HEMO FLAGS: (no result)
LYMPHOCYTES # BLD AUTO: 1.1 TH/MM3 (ref 1–4.8)
LYMPHOCYTES NFR BLD AUTO: 8 % (ref 9–44)
MCH RBC QN AUTO: 27.6 PG (ref 27–34)
MCHC RBC AUTO-ENTMCNC: 33 % (ref 32–36)
MCV RBC AUTO: 83.7 FL (ref 80–100)
MONOCYTES NFR BLD: 6.5 % (ref 0–8)
NEUTROPHILS # BLD AUTO: 11.1 TH/MM3 (ref 1.8–7.7)
NEUTROPHILS NFR BLD AUTO: 84.1 % (ref 16–70)
PLATELET # BLD: 1067 TH/MM3 (ref 150–450)
POTASSIUM SERPL-SCNC: 3.5 MEQ/L (ref 3.5–5.1)
RBC # BLD AUTO: 3.4 MIL/MM3 (ref 4.5–5.9)
SODIUM SERPL-SCNC: 138 MEQ/L (ref 136–145)
VANCOMYCIN TROUGH SERPL-MCNC: 10 MCG/ML (ref 5–10)
WBC # BLD AUTO: 13.2 TH/MM3 (ref 4–11)

## 2017-05-08 RX ADMIN — IPRATROPIUM BROMIDE AND ALBUTEROL SULFATE SCH AMPULE: .5; 3 SOLUTION RESPIRATORY (INHALATION) at 03:49

## 2017-05-08 RX ADMIN — PHENYTOIN SODIUM SCH MLS/HR: 50 INJECTION INTRAMUSCULAR; INTRAVENOUS at 21:14

## 2017-05-08 RX ADMIN — POLYVINYL ALCOHOL SCH DROP: 14 SOLUTION/ DROPS OPHTHALMIC at 17:17

## 2017-05-08 RX ADMIN — METOPROLOL TARTRATE SCH MG: 1 INJECTION, SOLUTION INTRAVENOUS at 05:16

## 2017-05-08 RX ADMIN — Medication SCH ML: at 08:14

## 2017-05-08 RX ADMIN — SENNOSIDES SCH MG: 8.6 TABLET, FILM COATED ORAL at 16:53

## 2017-05-08 RX ADMIN — SODIUM CHLORIDE SCH MLS/HR: 900 INJECTION INTRAVENOUS at 16:07

## 2017-05-08 RX ADMIN — CHLORHEXIDINE GLUCONATE SCH PACK: 500 CLOTH TOPICAL at 03:34

## 2017-05-08 RX ADMIN — POLYVINYL ALCOHOL SCH DROP: 14 SOLUTION/ DROPS OPHTHALMIC at 08:13

## 2017-05-08 RX ADMIN — PHENYTOIN SODIUM SCH MLS/HR: 50 INJECTION INTRAMUSCULAR; INTRAVENOUS at 10:15

## 2017-05-08 RX ADMIN — SENNOSIDES SCH MG: 8.6 TABLET, FILM COATED ORAL at 05:16

## 2017-05-08 RX ADMIN — BACITRACIN SCH APPLIC: 500 OINTMENT TOPICAL at 09:00

## 2017-05-08 RX ADMIN — SODIUM CHLORIDE SCH MLS/HR: 900 INJECTION INTRAVENOUS at 03:33

## 2017-05-08 RX ADMIN — MAGNESIUM HYDROXIDE SCH ML: 400 SUSPENSION ORAL at 21:14

## 2017-05-08 RX ADMIN — TAZOBACTAM SODIUM AND PIPERACILLIN SODIUM SCH MLS/HR: 500; 4 INJECTION, SOLUTION INTRAVENOUS at 23:51

## 2017-05-08 RX ADMIN — ACETAMINOPHEN PRN MG: 325 TABLET ORAL at 18:13

## 2017-05-08 RX ADMIN — TAZOBACTAM SODIUM AND PIPERACILLIN SODIUM SCH MLS/HR: 500; 4 INJECTION, SOLUTION INTRAVENOUS at 17:17

## 2017-05-08 RX ADMIN — MORPHINE SULFATE PRN MG: 2 INJECTION, SOLUTION INTRAMUSCULAR; INTRAVENOUS at 14:54

## 2017-05-08 RX ADMIN — ENOXAPARIN SODIUM SCH MG: 40 INJECTION SUBCUTANEOUS at 10:43

## 2017-05-08 RX ADMIN — METOPROLOL TARTRATE SCH MG: 1 INJECTION, SOLUTION INTRAVENOUS at 12:24

## 2017-05-08 RX ADMIN — Medication SCH MG: at 21:14

## 2017-05-08 RX ADMIN — SODIUM CHLORIDE SCH MLS/HR: 900 INJECTION INTRAVENOUS at 10:14

## 2017-05-08 RX ADMIN — MORPHINE SULFATE PRN MG: 2 INJECTION, SOLUTION INTRAMUSCULAR; INTRAVENOUS at 10:43

## 2017-05-08 RX ADMIN — TAZOBACTAM SODIUM AND PIPERACILLIN SODIUM SCH MLS/HR: 500; 4 INJECTION, SOLUTION INTRAVENOUS at 05:16

## 2017-05-08 RX ADMIN — POLYVINYL ALCOHOL SCH DROP: 14 SOLUTION/ DROPS OPHTHALMIC at 12:24

## 2017-05-08 RX ADMIN — FAMOTIDINE SCH MG: 20 TABLET, FILM COATED ORAL at 08:13

## 2017-05-08 RX ADMIN — TAZOBACTAM SODIUM AND PIPERACILLIN SODIUM SCH MLS/HR: 500; 4 INJECTION, SOLUTION INTRAVENOUS at 12:24

## 2017-05-08 RX ADMIN — BACITRACIN SCH APPLIC: 500 OINTMENT TOPICAL at 21:17

## 2017-05-08 RX ADMIN — ASPIRIN SCH MG: 325 TABLET ORAL at 08:13

## 2017-05-08 RX ADMIN — Medication SCH ML: at 21:14

## 2017-05-08 RX ADMIN — WATER SCH ML: 1 IRRIGANT IRRIGATION at 08:13

## 2017-05-08 NOTE — HHI.PR
Neuropsych


Progress Notes/Response to Tx


Contents of Sessions:  Level of Consciousness


Time with Patient:  15 minutes


Premorbid psychological status


Premorbid Cognitive, Emotional and Behavioral Status:  Unstable.  The patient 

has an unknown number of years of education and no real work history prior to 

this injury.  The patient has prior psychiatric difficulties, including 

reportedly bipolar disorder (reported by grandmother to staff but not 

independently corroborated).  Substance abuse history includes polysubstance 

dependence, in controlled environment.





Behavioral Reactions of Patient and Family/Support System:  Tenuous.    The 

patient apparently lives with his grandmother.  The patients family is 

experiencing ongoing issues of adjustment given the nature of the injury, and 

this aspect of recovery will require ongoing monitoring. 





Emotional/Behavioral Status of Patient and Family/Support System:  Unstable.  

Pertinent issues, if appropriate to this patients clinical care, are described 

in detail above.


Maximizing acute care outcome


It is recommended that the patient be monitored for emergent behavioral 

impulsivity as the medical condition evolves.  This patients neuropathological 

challenges may limit their rehabilitation potential going forward, and these 

challenges will require specialized therapeutic skills to maximize outcome.  

Additionally, the patients family is experiencing ongoing issues of adjustment 

given the traumatic nature of the injury, and they will benefit from ongoing 

psychological assistance.


Anticipated Problems


Ongoing areas of concern will include behavioral impulsivity, lack of insight 

and judgment, which is expected to improve with time and treatment.


Treatment Plan


This clinician will continue to follow with you throughout the course of this 

patients acute care treatment, and I will be available to meet with the patient

s family/support system to facilitate their understanding and the ongoing care 

of their family member.  The goals of neuropsychological intervention shall be 

both educational and supportive to the family/support system as is deemed 

clinically appropriate.


[facility] Level:  V:Confused-non agitated


Impression


Young man with severe traumatic brain injury superimposed on underlying history 

of polysubstance dependence.


Diagnosis:  


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 

behavioral disturbance


Status:  Acute


(2) Polysubstance dependence in controlled environment


Status:  Acute


Progress Note Narrative


Ongoing follow-up of patient seen during daily trauma rounds.  This is day 12 

post injury.~ He is awake and alert, eating and drinking, and was extubated on 

Friday.  GCS around 13.  No agitated but lethargic.  Other than morphine, this 

patient is on no sedation.  This patient is a definite [place].  He is pending 

to transfer to [place].      I will continue to follow.








Ricky Knutson PhD May 8, 2017 11:35
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
## 2017-05-09 VITALS
RESPIRATION RATE: 18 BRPM | TEMPERATURE: 98.5 F | HEART RATE: 105 BPM | SYSTOLIC BLOOD PRESSURE: 129 MMHG | DIASTOLIC BLOOD PRESSURE: 76 MMHG | OXYGEN SATURATION: 99 %

## 2017-05-09 VITALS
DIASTOLIC BLOOD PRESSURE: 81 MMHG | TEMPERATURE: 97.7 F | HEART RATE: 97 BPM | OXYGEN SATURATION: 100 % | SYSTOLIC BLOOD PRESSURE: 138 MMHG | RESPIRATION RATE: 20 BRPM

## 2017-05-09 VITALS
HEART RATE: 82 BPM | TEMPERATURE: 98.8 F | OXYGEN SATURATION: 100 % | RESPIRATION RATE: 17 BRPM | SYSTOLIC BLOOD PRESSURE: 129 MMHG | DIASTOLIC BLOOD PRESSURE: 79 MMHG

## 2017-05-09 VITALS
SYSTOLIC BLOOD PRESSURE: 136 MMHG | DIASTOLIC BLOOD PRESSURE: 75 MMHG | TEMPERATURE: 98.8 F | OXYGEN SATURATION: 100 % | RESPIRATION RATE: 17 BRPM | HEART RATE: 84 BPM

## 2017-05-09 VITALS
DIASTOLIC BLOOD PRESSURE: 83 MMHG | SYSTOLIC BLOOD PRESSURE: 158 MMHG | RESPIRATION RATE: 20 BRPM | HEART RATE: 116 BPM | TEMPERATURE: 100.6 F | OXYGEN SATURATION: 100 %

## 2017-05-09 LAB
ALP SERPL-CCNC: 99 U/L (ref 45–117)
ALT SERPL-CCNC: 76 U/L (ref 9–52)
ANION GAP SERPL CALC-SCNC: 9 MEQ/L (ref 5–15)
AST SERPL-CCNC: 50 U/L (ref 15–39)
BASOPHILS # BLD AUTO: 0.1 TH/MM3 (ref 0–0.2)
BASOPHILS NFR BLD: 0.6 % (ref 0–2)
BILIRUB SERPL-MCNC: 1 MG/DL (ref 0.2–1)
BUN SERPL-MCNC: 6 MG/DL (ref 7–18)
CHLORIDE SERPL-SCNC: 107 MEQ/L (ref 98–107)
EOSINOPHIL # BLD: 0.2 TH/MM3 (ref 0–0.4)
EOSINOPHIL NFR BLD: 2 % (ref 0–4)
ERYTHROCYTE [DISTWIDTH] IN BLOOD BY AUTOMATED COUNT: 16.8 % (ref 11.6–17.2)
GFR SERPLBLD BASED ON 1.73 SQ M-ARVRAT: 228 ML/MIN (ref 89–?)
HCO3 BLD-SCNC: 24 MEQ/L (ref 21–32)
HCT VFR BLD CALC: 28 % (ref 39–51)
HEMO FLAGS: (no result)
LYMPHOCYTES # BLD AUTO: 1.5 TH/MM3 (ref 1–4.8)
LYMPHOCYTES NFR BLD AUTO: 15.1 % (ref 9–44)
MAGNESIUM SERPL-MCNC: 2.3 MG/DL (ref 1.5–2.5)
MCH RBC QN AUTO: 28 PG (ref 27–34)
MCHC RBC AUTO-ENTMCNC: 33.4 % (ref 32–36)
MCV RBC AUTO: 84 FL (ref 80–100)
MONOCYTES NFR BLD: 9 % (ref 0–8)
NEUTROPHILS # BLD AUTO: 7.3 TH/MM3 (ref 1.8–7.7)
NEUTROPHILS NFR BLD AUTO: 73.3 % (ref 16–70)
PLATELET # BLD: 970 TH/MM3 (ref 150–450)
POTASSIUM SERPL-SCNC: 3.1 MEQ/L (ref 3.5–5.1)
RBC # BLD AUTO: 3.33 MIL/MM3 (ref 4.5–5.9)
SODIUM SERPL-SCNC: 140 MEQ/L (ref 136–145)
WBC # BLD AUTO: 10 TH/MM3 (ref 4–11)

## 2017-05-09 RX ADMIN — TAZOBACTAM SODIUM AND PIPERACILLIN SODIUM SCH MLS/HR: 500; 4 INJECTION, SOLUTION INTRAVENOUS at 11:59

## 2017-05-09 RX ADMIN — PHENYTOIN SODIUM SCH MLS/HR: 50 INJECTION INTRAMUSCULAR; INTRAVENOUS at 04:28

## 2017-05-09 RX ADMIN — TAZOBACTAM SODIUM AND PIPERACILLIN SODIUM SCH MLS/HR: 500; 4 INJECTION, SOLUTION INTRAVENOUS at 04:28

## 2017-05-09 RX ADMIN — TAZOBACTAM SODIUM AND PIPERACILLIN SODIUM SCH MLS/HR: 500; 4 INJECTION, SOLUTION INTRAVENOUS at 18:38

## 2017-05-09 RX ADMIN — PHENYTOIN SODIUM SCH MLS/HR: 50 INJECTION INTRAMUSCULAR; INTRAVENOUS at 15:56

## 2017-05-09 RX ADMIN — POLYVINYL ALCOHOL SCH DROP: 14 SOLUTION/ DROPS OPHTHALMIC at 18:38

## 2017-05-09 RX ADMIN — Medication SCH MG: at 10:45

## 2017-05-09 RX ADMIN — BACITRACIN SCH APPLIC: 500 OINTMENT TOPICAL at 10:46

## 2017-05-09 RX ADMIN — ACETAMINOPHEN PRN MG: 325 TABLET ORAL at 04:27

## 2017-05-09 RX ADMIN — ASPIRIN SCH MG: 325 TABLET ORAL at 10:46

## 2017-05-09 RX ADMIN — SENNOSIDES SCH MG: 8.6 TABLET, FILM COATED ORAL at 18:38

## 2017-05-09 RX ADMIN — POLYVINYL ALCOHOL SCH DROP: 14 SOLUTION/ DROPS OPHTHALMIC at 10:45

## 2017-05-09 RX ADMIN — HYDROCODONE BITARTRATE AND ACETAMINOPHEN PRN TAB: 7.5; 325 TABLET ORAL at 01:24

## 2017-05-09 RX ADMIN — HYDROCODONE BITARTRATE AND ACETAMINOPHEN PRN TAB: 7.5; 325 TABLET ORAL at 14:51

## 2017-05-09 RX ADMIN — SENNOSIDES SCH MG: 8.6 TABLET, FILM COATED ORAL at 04:27

## 2017-05-09 RX ADMIN — Medication SCH ML: at 10:46

## 2017-05-09 RX ADMIN — WATER SCH ML: 1 IRRIGANT IRRIGATION at 10:46

## 2017-05-09 RX ADMIN — HYDROCODONE BITARTRATE AND ACETAMINOPHEN PRN TAB: 7.5; 325 TABLET ORAL at 10:46

## 2017-05-09 RX ADMIN — ENOXAPARIN SODIUM SCH MG: 40 INJECTION SUBCUTANEOUS at 11:59

## 2017-05-09 RX ADMIN — POLYVINYL ALCOHOL SCH DROP: 14 SOLUTION/ DROPS OPHTHALMIC at 13:00

## 2017-05-09 RX ADMIN — HYDROCODONE BITARTRATE AND ACETAMINOPHEN PRN TAB: 7.5; 325 TABLET ORAL at 06:07
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
## 2017-05-12 LAB — ULNAR PULSE: PRESENT

## 2017-07-19 ENCOUNTER — HOSPITAL ENCOUNTER (EMERGENCY)
Dept: HOSPITAL 17 - NEPD | Age: 22
Discharge: HOME | End: 2017-07-19
Payer: MEDICAID

## 2017-07-19 VITALS — WEIGHT: 139.99 LBS | BODY MASS INDEX: 22.5 KG/M2 | HEIGHT: 66 IN

## 2017-07-19 VITALS
RESPIRATION RATE: 20 BRPM | DIASTOLIC BLOOD PRESSURE: 73 MMHG | HEART RATE: 74 BPM | TEMPERATURE: 98.6 F | OXYGEN SATURATION: 100 % | SYSTOLIC BLOOD PRESSURE: 130 MMHG

## 2017-07-19 VITALS — OXYGEN SATURATION: 99 %

## 2017-07-19 DIAGNOSIS — F41.9: ICD-10-CM

## 2017-07-19 DIAGNOSIS — F17.220: ICD-10-CM

## 2017-07-19 DIAGNOSIS — R20.0: ICD-10-CM

## 2017-07-19 DIAGNOSIS — Z79.899: ICD-10-CM

## 2017-07-19 DIAGNOSIS — N62: ICD-10-CM

## 2017-07-19 DIAGNOSIS — Z86.718: ICD-10-CM

## 2017-07-19 DIAGNOSIS — K58.9: ICD-10-CM

## 2017-07-19 DIAGNOSIS — F31.9: ICD-10-CM

## 2017-07-19 DIAGNOSIS — N20.0: Primary | ICD-10-CM

## 2017-07-19 LAB
ANION GAP SERPL CALC-SCNC: 6 MEQ/L (ref 5–15)
APTT BLD: 33.5 SEC (ref 24.3–30.1)
BACTERIA #/AREA URNS HPF: (no result) /HPF
BASOPHILS # BLD AUTO: 0 TH/MM3 (ref 0–0.2)
BASOPHILS NFR BLD: 0.3 % (ref 0–2)
BUN SERPL-MCNC: 8 MG/DL (ref 7–18)
CHLORIDE SERPL-SCNC: 99 MEQ/L (ref 98–107)
COLOR UR: (no result)
COMMENT (UR): (no result)
CULTURE IF INDICATED: (no result)
EOSINOPHIL # BLD: 0.1 TH/MM3 (ref 0–0.4)
EOSINOPHIL NFR BLD: 1.4 % (ref 0–4)
ERYTHROCYTE [DISTWIDTH] IN BLOOD BY AUTOMATED COUNT: 13.4 % (ref 11.6–17.2)
GFR SERPLBLD BASED ON 1.73 SQ M-ARVRAT: 116 ML/MIN (ref 89–?)
GLUCOSE UR STRIP-MCNC: (no result) MG/DL
HCO3 BLD-SCNC: 30.2 MEQ/L (ref 21–32)
HCT VFR BLD CALC: 42 % (ref 39–51)
HEMO FLAGS: (no result)
HGB UR QL STRIP: (no result)
INR PPP: 1 RATIO
KETONES UR STRIP-MCNC: (no result) MG/DL
LYMPHOCYTES # BLD AUTO: 2.5 TH/MM3 (ref 1–4.8)
LYMPHOCYTES NFR BLD AUTO: 28.1 % (ref 9–44)
MCH RBC QN AUTO: 29.9 PG (ref 27–34)
MCHC RBC AUTO-ENTMCNC: 34.5 % (ref 32–36)
MCV RBC AUTO: 86.5 FL (ref 80–100)
MONOCYTES NFR BLD: 7.7 % (ref 0–8)
NEUTROPHILS # BLD AUTO: 5.6 TH/MM3 (ref 1.8–7.7)
NEUTROPHILS NFR BLD AUTO: 62.5 % (ref 16–70)
NITRITE UR QL STRIP: (no result)
PLATELET # BLD: 443 TH/MM3 (ref 150–450)
POTASSIUM SERPL-SCNC: 4.4 MEQ/L (ref 3.5–5.1)
PROTHROMBIN TIME: 10.6 SEC (ref 9.8–11.6)
RBC # BLD AUTO: 4.86 MIL/MM3 (ref 4.5–5.9)
SODIUM SERPL-SCNC: 135 MEQ/L (ref 136–145)
SP GR UR STRIP: 1.02 (ref 1–1.03)
WBC # BLD AUTO: 8.9 TH/MM3 (ref 4–11)

## 2017-07-19 PROCEDURE — 85610 PROTHROMBIN TIME: CPT

## 2017-07-19 PROCEDURE — 80048 BASIC METABOLIC PNL TOTAL CA: CPT

## 2017-07-19 PROCEDURE — 85730 THROMBOPLASTIN TIME PARTIAL: CPT

## 2017-07-19 PROCEDURE — 74176 CT ABD & PELVIS W/O CONTRAST: CPT

## 2017-07-19 PROCEDURE — 87086 URINE CULTURE/COLONY COUNT: CPT

## 2017-07-19 PROCEDURE — 81001 URINALYSIS AUTO W/SCOPE: CPT

## 2017-07-19 PROCEDURE — 85025 COMPLETE CBC W/AUTO DIFF WBC: CPT

## 2017-07-19 NOTE — RADRPT
EXAM DATE/TIME:  07/19/2017 20:14 

 

[facility] COMPARISON:     

No previous studies available for comparison.

 

 

INDICATIONS :     

Hematuria for one day.

                  

 

ORAL CONTRAST:      

No oral contrast ingested.

                  

 

RADIATION DOSE:     

4.73 CTDIvol (mGy) 

 

 

MEDICAL HISTORY :     

None  

 

SURGICAL HISTORY :       

CSP fracture.

 

ENCOUNTER:      

Initial

 

ACUITY:      

1 day

 

PAIN SCALE:      

2/10

 

LOCATION:       

Left  side numbness.

 

TECHNIQUE:     

Volumetric scanning of the abdomen and pelvis was performed.  Using automated exposure control and ad
justment of the mA and/or kV according to patient size, radiation dose was kept as low as reasonably 
achievable to obtain optimal diagnostic quality images.  DICOM format image data is available electro
nically for review and comparison.  

 

FINDINGS:     

Lung bases are clear. Bilateral gynecomastia identified. The osseous structures are intact. The splee
n, pancreas, adrenals, gallbladder, liver, left kidney unremarkable. Nonobstructing right lower pole 
renal calculus measuring 5 mm. There is no hydronephrosis or hydroureter. At the level of the right u
reterovesical junction a calculus is present measuring 4 mm. The bladder is otherwise unremarkable. N
o evidence of bowel obstruction. Stomach, small bowel, large bowel and appendix are normal. No adenop
athy or aneurysm.

 

CONCLUSION:     

1. Gynecomastia.

2. Right UVJ calculus without hydronephrosis or hydroureter.

3. Nonobstructing right renal calculus. 

 

 

 Que Pan MD on July 19, 2017 at 20:27           

Board Certified Radiologist.

 This report was verified electronically.
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
## 2018-01-29 ENCOUNTER — HOSPITAL ENCOUNTER (OUTPATIENT)
Dept: HOSPITAL 17 - NEPC | Age: 23
Setting detail: OBSERVATION
LOS: 1 days | Discharge: HOME | End: 2018-01-30
Attending: NEUROLOGICAL SURGERY | Admitting: NEUROLOGICAL SURGERY
Payer: MEDICAID

## 2018-01-29 VITALS
HEART RATE: 60 BPM | DIASTOLIC BLOOD PRESSURE: 74 MMHG | TEMPERATURE: 98.6 F | RESPIRATION RATE: 16 BRPM | OXYGEN SATURATION: 97 % | SYSTOLIC BLOOD PRESSURE: 111 MMHG

## 2018-01-29 VITALS — BODY MASS INDEX: 19.84 KG/M2 | WEIGHT: 123.46 LBS | HEIGHT: 66 IN

## 2018-01-29 VITALS
DIASTOLIC BLOOD PRESSURE: 60 MMHG | HEART RATE: 54 BPM | SYSTOLIC BLOOD PRESSURE: 118 MMHG | OXYGEN SATURATION: 99 % | RESPIRATION RATE: 14 BRPM | TEMPERATURE: 98.2 F

## 2018-01-29 DIAGNOSIS — F10.129: ICD-10-CM

## 2018-01-29 DIAGNOSIS — S00.83XA: ICD-10-CM

## 2018-01-29 DIAGNOSIS — R51: ICD-10-CM

## 2018-01-29 DIAGNOSIS — S12.100A: Primary | ICD-10-CM
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
## 2018-01-29 LAB
BASOPHILS # BLD AUTO: 0.1 TH/MM3 (ref 0–0.2)
BASOPHILS NFR BLD: 0.7 % (ref 0–2)
BUN SERPL-MCNC: 7 MG/DL (ref 7–18)
CALCIUM SERPL-MCNC: 8.5 MG/DL (ref 8.5–10.1)
CHLORIDE SERPL-SCNC: 104 MEQ/L (ref 98–107)
CREAT SERPL-MCNC: 0.82 MG/DL (ref 0.6–1.3)
EOSINOPHIL # BLD: 0.1 TH/MM3 (ref 0–0.4)
EOSINOPHIL NFR BLD: 1.4 % (ref 0–4)
ERYTHROCYTE [DISTWIDTH] IN BLOOD BY AUTOMATED COUNT: 12.7 % (ref 11.6–17.2)
GFR SERPLBLD BASED ON 1.73 SQ M-ARVRAT: 120 ML/MIN (ref 89–?)
GLUCOSE SERPL-MCNC: 93 MG/DL (ref 74–106)
HCO3 BLD-SCNC: 31 MEQ/L (ref 21–32)
HCT VFR BLD CALC: 45.2 % (ref 39–51)
HGB BLD-MCNC: 16.2 GM/DL (ref 13–17)
INR PPP: 1.1 RATIO
LYMPHOCYTES # BLD AUTO: 4.9 TH/MM3 (ref 1–4.8)
LYMPHOCYTES NFR BLD AUTO: 52.1 % (ref 9–44)
MCH RBC QN AUTO: 31.2 PG (ref 27–34)
MCHC RBC AUTO-ENTMCNC: 35.9 % (ref 32–36)
MCV RBC AUTO: 86.7 FL (ref 80–100)
MONOCYTE #: 0.6 TH/MM3 (ref 0–0.9)
MONOCYTES NFR BLD: 6.7 % (ref 0–8)
NEUTROPHILS # BLD AUTO: 3.7 TH/MM3 (ref 1.8–7.7)
NEUTROPHILS NFR BLD AUTO: 39.1 % (ref 16–70)
PLATELET # BLD: 385 TH/MM3 (ref 150–450)
PMV BLD AUTO: 8.5 FL (ref 7–11)
PROTHROMBIN TIME: 11.2 SEC (ref 9.8–11.6)
RBC # BLD AUTO: 5.21 MIL/MM3 (ref 4.5–5.9)
SODIUM SERPL-SCNC: 142 MEQ/L (ref 136–145)
WBC # BLD AUTO: 9.5 TH/MM3 (ref 4–11)

## 2018-01-29 PROCEDURE — 96365 THER/PROPH/DIAG IV INF INIT: CPT

## 2018-01-29 PROCEDURE — 83735 ASSAY OF MAGNESIUM: CPT

## 2018-01-29 PROCEDURE — 70450 CT HEAD/BRAIN W/O DYE: CPT

## 2018-01-29 PROCEDURE — 86900 BLOOD TYPING SEROLOGIC ABO: CPT

## 2018-01-29 PROCEDURE — 85610 PROTHROMBIN TIME: CPT

## 2018-01-29 PROCEDURE — 94150 VITAL CAPACITY TEST: CPT

## 2018-01-29 PROCEDURE — 72125 CT NECK SPINE W/O DYE: CPT

## 2018-01-29 PROCEDURE — 80307 DRUG TEST PRSMV CHEM ANLYZR: CPT

## 2018-01-29 PROCEDURE — 99285 EMERGENCY DEPT VISIT HI MDM: CPT

## 2018-01-29 PROCEDURE — G0378 HOSPITAL OBSERVATION PER HR: HCPCS

## 2018-01-29 PROCEDURE — 80048 BASIC METABOLIC PNL TOTAL CA: CPT

## 2018-01-29 PROCEDURE — 72040 X-RAY EXAM NECK SPINE 2-3 VW: CPT

## 2018-01-29 PROCEDURE — 85730 THROMBOPLASTIN TIME PARTIAL: CPT

## 2018-01-29 PROCEDURE — 86850 RBC ANTIBODY SCREEN: CPT

## 2018-01-29 PROCEDURE — 86901 BLOOD TYPING SEROLOGIC RH(D): CPT

## 2018-01-29 PROCEDURE — 85025 COMPLETE CBC W/AUTO DIFF WBC: CPT
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
## 2018-01-29 NOTE — RADRPT
EXAM DATE/TIME:  01/29/2018 23:14 

 

[facility] COMPARISON:     

No previous studies available for comparison.

 

 

INDICATIONS :     

Trauma. Fall.

                      

 

RADIATION DOSE:     

56.34 CTDIvol (mGy) 

 

 

 

MEDICAL HISTORY :       

Brain injury

 

SURGICAL HISTORY :      

None. 

 

ENCOUNTER:      

Initial

 

ACUITY:      

1 day

 

PAIN SCALE:      

Non-responsive

 

LOCATION:        

cranial 

 

TECHNIQUE:     

Multiple contiguous axial images were obtained of the head.  Using automated exposure control and adj
ustment of the mA and/or kV according to patient size, radiation dose was kept as low as reasonably a
chievable to obtain optimal diagnostic quality images.   DICOM format image data is available electro
nically for review and comparison.  

 

FINDINGS:     

 

CEREBRUM:     

The ventricles are normal for age.  No evidence of midline shift, mass lesion, hemorrhage or acute in
farction.  No extra-axial fluid collections are seen.

 

POSTERIOR FOSSA:     

The cerebellum and brainstem are intact.  The 4th ventricle is midline.  The cerebellopontine angle i
s unremarkable.

 

EXTRACRANIAL:     

The visualized portion of the orbits is intact.

 

SKULL:     

The calvaria is intact.  No evidence of skull fracture.

 

CONCLUSION:     

No acute intracranial abnormalities. Incidental basal ganglia calcification April 27, 2017. Small lef
t frontal scalp laceration. 

 

 

 Erick Sagastume MD on January 29, 2018 at 23:18           

Board Certified Radiologist.

 This report was verified electronically.

## 2018-01-29 NOTE — RADRPT
EXAM DATE/TIME:  01/29/2018 23:16 

 

[facility] COMPARISON:     

CT CERVICAL SPINE W/O CONTRAST, April 26, 2017, 17:23.

 

 

INDICATIONS :     

Trauma. Fall.

                      

 

RADIATION DOSE:     

32.34 CTDIvol (mGy) 

 

 

 

MEDICAL HISTORY :       

Brain injury

 

SURGICAL HISTORY :       

Cervical fracture

 

ENCOUNTER:      

Initial

 

ACUITY:      

1 day

 

PAIN SCALE:      

Non-responsive

 

LOCATION:        

neck 

 

TECHNIQUE:     

Volumetric scanning of the cervical spine was performed. Multiplanar reconstructions in the sagittal,
 coronal and oblique axial planes were performed.   Using automated exposure control and adjustment o
f the mA and/or kV according to patient size, radiation dose was kept as low as reasonably achievable
 to obtain optimal diagnostic quality images.   DICOM format image data is available electronically f
or review and comparison.  

 

FINDINGS:     

There is nonunion fracture through the base of the dens and 2 mm of separation anteriorly. There is n
o fahad-posterior displacement of the dens. No significant prevertebral soft tissue swelling.

 

Previous spinous process fracture at C6 healed. No new fractures present. Normal alignment. No canal 
stenosis or discrete disc protrusions.

 

CONCLUSION:     

1. 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 No significant displacement. Healed
 spinous process fracture at C6. No acute fracture.

 

 

 

 Erick Sagastume MD on January 29, 2018 at 23:28           

Board Certified Radiologist.

 This report was verified electronically.

## 2018-01-30 VITALS
SYSTOLIC BLOOD PRESSURE: 112 MMHG | OXYGEN SATURATION: 100 % | RESPIRATION RATE: 18 BRPM | HEART RATE: 57 BPM | DIASTOLIC BLOOD PRESSURE: 76 MMHG | TEMPERATURE: 96.9 F

## 2018-01-30 VITALS
TEMPERATURE: 98.2 F | OXYGEN SATURATION: 100 % | HEART RATE: 88 BPM | DIASTOLIC BLOOD PRESSURE: 66 MMHG | RESPIRATION RATE: 18 BRPM | SYSTOLIC BLOOD PRESSURE: 119 MMHG

## 2018-01-30 VITALS
SYSTOLIC BLOOD PRESSURE: 124 MMHG | RESPIRATION RATE: 12 BRPM | OXYGEN SATURATION: 100 % | DIASTOLIC BLOOD PRESSURE: 81 MMHG | HEART RATE: 79 BPM

## 2018-01-30 VITALS
OXYGEN SATURATION: 98 % | SYSTOLIC BLOOD PRESSURE: 114 MMHG | TEMPERATURE: 98.4 F | DIASTOLIC BLOOD PRESSURE: 66 MMHG | RESPIRATION RATE: 18 BRPM | HEART RATE: 88 BPM

## 2018-01-30 LAB
BASOPHILS # BLD AUTO: 0 TH/MM3 (ref 0–0.2)
BASOPHILS NFR BLD: 0.5 % (ref 0–2)
BUN SERPL-MCNC: 6 MG/DL (ref 7–18)
CALCIUM SERPL-MCNC: 7.9 MG/DL (ref 8.5–10.1)
CHLORIDE SERPL-SCNC: 109 MEQ/L (ref 98–107)
CREAT SERPL-MCNC: 0.72 MG/DL (ref 0.6–1.3)
EOSINOPHIL # BLD: 0.1 TH/MM3 (ref 0–0.4)
EOSINOPHIL NFR BLD: 1.3 % (ref 0–4)
ERYTHROCYTE [DISTWIDTH] IN BLOOD BY AUTOMATED COUNT: 12.6 % (ref 11.6–17.2)
GFR SERPLBLD BASED ON 1.73 SQ M-ARVRAT: 139 ML/MIN (ref 89–?)
GLUCOSE SERPL-MCNC: 93 MG/DL (ref 74–106)
HCO3 BLD-SCNC: 31 MEQ/L (ref 21–32)
HCT VFR BLD CALC: 44.9 % (ref 39–51)
HGB BLD-MCNC: 15.3 GM/DL (ref 13–17)
LYMPHOCYTES # BLD AUTO: 2.9 TH/MM3 (ref 1–4.8)
LYMPHOCYTES NFR BLD AUTO: 49 % (ref 9–44)
MCH RBC QN AUTO: 30.2 PG (ref 27–34)
MCHC RBC AUTO-ENTMCNC: 34 % (ref 32–36)
MCV RBC AUTO: 88.7 FL (ref 80–100)
MONOCYTE #: 0.4 TH/MM3 (ref 0–0.9)
MONOCYTES NFR BLD: 6.7 % (ref 0–8)
NEUTROPHILS # BLD AUTO: 2.5 TH/MM3 (ref 1.8–7.7)
NEUTROPHILS NFR BLD AUTO: 42.5 % (ref 16–70)
PLATELET # BLD: 318 TH/MM3 (ref 150–450)
PMV BLD AUTO: 8.3 FL (ref 7–11)
RBC # BLD AUTO: 5.06 MIL/MM3 (ref 4.5–5.9)
SODIUM SERPL-SCNC: 146 MEQ/L (ref 136–145)
WBC # BLD AUTO: 5.8 TH/MM3 (ref 4–11)

## 2018-01-30 RX ADMIN — DEXTROSE MONOHYDRATE, SODIUM CHLORIDE, AND POTASSIUM CHLORIDE SCH MLS/HR: 50; 9; 1.49 INJECTION, SOLUTION INTRAVENOUS at 00:32

## 2018-01-30 RX ADMIN — DEXTROSE MONOHYDRATE, SODIUM CHLORIDE, AND POTASSIUM CHLORIDE SCH MLS/HR: 50; 9; 1.49 INJECTION, SOLUTION INTRAVENOUS at 09:45
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
## 2018-01-30 NOTE — HHI.HP
__________________________________________________





[facility]


Primary Care Physician


Waldemar Shelton M.D.





Past Family Social History


Allergies:  


Coded Allergies:  


     acetaminophen (Unverified  Allergy, Intermediate, 18)


     oxycodone (Unverified  Allergy, Intermediate, 18)


Uncoded Allergies:  


     Cefepime (Allergy, Intermediate, Rash, 17)


 Was on concomitant Vanco IV, Keppra which could have caused


 rash. Can be rechallenged under ID supervision in future.





Physical Exam


Vital Signs





Vital Signs








  Date Time  Temp Pulse Resp B/P (MAP) Pulse Ox O2 Delivery O2 Flow Rate FiO2


 


18 23:28   15  99 Room Air  


 


18 23:02 98.2 54 14 118/60 (79) 99   


 


18 22:46 98.6 60 16 111/74 (86) 97 Room Air  








Laboratory





Laboratory Tests








Test


  18


23:23


 


White Blood Count 9.5 


 


Red Blood Count 5.21 


 


Hemoglobin 16.2 


 


Hematocrit 45.2 


 


Mean Corpuscular Volume 86.7 


 


Mean Corpuscular Hemoglobin 31.2 


 


Mean Corpuscular Hemoglobin


Concent 35.9 


 


 


Red Cell Distribution Width 12.7 


 


Platelet Count 385 


 


Mean Platelet Volume 8.5 


 


Neutrophils (%) (Auto) 39.1 


 


Lymphocytes (%) (Auto) 52.1 


 


Monocytes (%) (Auto) 6.7 


 


Eosinophils (%) (Auto) 1.4 


 


Basophils (%) (Auto) 0.7 


 


Neutrophils # (Auto) 3.7 


 


Lymphocytes # (Auto) 4.9 


 


Monocytes # (Auto) 0.6 


 


Eosinophils # (Auto) 0.1 


 


Basophils # (Auto) 0.1 


 


CBC Comment DIFF FINAL 


 


Differential Comment  


 


Prothrombin Time 11.2 


 


Prothromb Time International


Ratio 1.1 


 


 


Activated Partial


Thromboplast Time 30.3 


 


 


Blood Urea Nitrogen 7 


 


Creatinine 0.82 


 


Random Glucose 93 


 


Calcium Level 8.5 


 


Sodium Level 142 


 


Potassium Level 3.5 


 


Chloride Level 104 


 


Carbon Dioxide Level 31.0 


 


Anion Gap 7 


 


Estimat Glomerular Filtration


Rate 120 


 


 


Ethyl Alcohol Level 276 








Result Diagram:  


18





Imaging


CT Head negatie acute changes





cT C spine chronic non displaced C2 fracture





Caprini VTE Risk Assessment


Caprini VTE Risk Assessment:  No/Low Risk (score <= 1)


Caprini Risk Assessment Model











 Point Value = 1          Point Value = 2  Point Value = 3  Point Value = 5


 


Age 41-60


Minor surgery


BMI > 25 kg/m2


Swollen legs


Varicose veins


Pregnancy or postpartum


History of unexplained or recurrent


   spontaneous 


Oral contraceptives or hormone


   replacement


Sepsis (< 1 month)


Serious lung disease, including


   pneumonia (< 1 month)


Abnormal pulmonary function


Acute myocardial infarction


Congestive heart failure (< 1 month)


History of inflammatory bowel disease


Medical patient at bed rest Age 61-74


Arthroscopic surgery


Major open surgery (> 45 min)


Laparoscopic surgery (> 45 min)


Malignancy


Confined to bed (> 72 hours)


Immobilizing plaster cast


Central venous access Age >= 75


History of VTE


Family history of VTE


Factor V Leiden


Prothrombin 63614S


Lupus anticoagulant


Anticardiolipin antibodies


Elevated serum homocysteine


Heparin-induced thrombocytopenia


Other congenital or acquired


   thrombophilia Stroke (< 1 month)


Elective arthroplasty


Hip, pelvis, or leg fracture


Acute spinal cord injury (< 1 month)








Prophylaxis Regimen











   Total Risk


Factor Score Risk Level Prophylaxis Regimen


 


0-1      Low Early ambulation


 


2 Moderate Order ONE of the following:


*Sequential Compression Device (SCD)


*Heparin 5000 units SQ BID


 


3-4 Higher Order ONE of the following medications:


*Heparin 5000 units SQ TID


*Enoxaparin/Lovenox 40 mg SQ daily (WT < 150 kg, CrCl > 30 mL/min)


*Enoxaparin/Lovenox 30 mg SQ daily (WT < 150 kg, CrCl > 10-29 mL/min)


*Enoxaparin/Lovenox 30 mg SQ BID (WT < 150 kg, CrCl > 30 mL/min)


AND/OR


*Sequential Compression Device (SCD)


 


5 or more Highest Order ONE of the following medications:


*Heparin 5000 units SQ TID (Preferred with Epidurals)


*Enoxaparin/Lovenox 40 mg SQ daily (WT < 150 kg, CrCl > 30 mL/min)


*Enoxaparin/Lovenox 30 mg SQ daily (WT < 150 kg, CrCl > 10-29 mL/min)


*Enoxaparin/Lovenox 30 mg SQ BID (WT < 150 kg, CrCl > 30 mL/min)


AND


*Sequential Compression Device (SCD)











Assessment and Plan


Assessment and Plan


Impression:


!. Chronic nondisplaced C2 fractue


2, Etoh intoxication








Plan:


Discuused with ED


Observation for Etoh intoxication.


Will check cervical lateral flexion and extension  x-ray and discuss C2 

fracture treatment options with patient when not intoxicated


No acute neurosurgical issues











Duran Rocha MD 2018 00:01
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
## 2018-01-30 NOTE — HHI.DCPOC
Discharge Care Plan


Diagnosis:  


(1) C2 cervical fracture


(2) Alcohol intoxication








Your Health Problems Are: Loss of Movements








Goals to Promote Your Health


* To prevent worsening of your condition and complications


* To maintain your health at the optimal level





Wear the cervical collar at all times. It may briefly be removed for personal 

hygiene. 


No twisting the neck, lifting, bending, pushing, pulling or other strenuous 

activity. 


Take the pain medication as prescribed. 


Avoid taking any medication that contains aspirin or NSAIDs (ibuprofen, naproxen

, Motrin, Advil, Naprosyn). 


No smoking due to adverse effects on bone healing. 


Call [phone] to schedule a follow up with Dr Hall to discuss treatment 

options.


Directions to Meet Your Goals


*** Take your medications as prescribed


*** Follow your dietary instruction


*** Follow activity as directed





Wear the cervical collar at all times. It may briefly be removed for personal 

hygiene. 


No twisting the neck, lifting, bending, pushing, pulling or other strenuous 

activity. 


Take the pain medication as prescribed. 


Avoid taking any medication that contains aspirin or NSAIDs (ibuprofen, naproxen

, Motrin, Advil, Naprosyn). 


No smoking due to adverse effects on bone healing. 


Call [phone] to schedule a follow up with Dr Hlal to discuss treatment 

options.





*** Keep your appointments as scheduled


*** Take your immunizations and boosters as scheduled


*** If your symptoms worsen call your PCP, if no PCP go to Urgent Care Center 

or Emergency Room***


*** Smoking is Dangerous to Your Health. Avoid second hand smoke***


***Call the 24-hour hour crisis hotline for domestic abuse at [phone]***











Topher Mora Jan 30, 2018 14:00

## 2018-05-19 ENCOUNTER — HOSPITAL ENCOUNTER (EMERGENCY)
Dept: HOSPITAL 17 - NEPE | Age: 23
Discharge: HOME | End: 2018-05-19
Payer: MEDICAID

## 2018-05-19 VITALS — OXYGEN SATURATION: 99 % | RESPIRATION RATE: 17 BRPM

## 2018-05-19 VITALS
SYSTOLIC BLOOD PRESSURE: 110 MMHG | RESPIRATION RATE: 18 BRPM | OXYGEN SATURATION: 99 % | HEART RATE: 73 BPM | DIASTOLIC BLOOD PRESSURE: 60 MMHG

## 2018-05-19 VITALS — BODY MASS INDEX: 19.84 KG/M2 | WEIGHT: 123.46 LBS | HEIGHT: 66 IN

## 2018-05-19 VITALS
SYSTOLIC BLOOD PRESSURE: 115 MMHG | DIASTOLIC BLOOD PRESSURE: 75 MMHG | OXYGEN SATURATION: 100 % | RESPIRATION RATE: 15 BRPM | TEMPERATURE: 97.7 F | HEART RATE: 50 BPM

## 2018-05-19 VITALS
OXYGEN SATURATION: 99 % | RESPIRATION RATE: 15 BRPM | SYSTOLIC BLOOD PRESSURE: 130 MMHG | HEART RATE: 88 BPM | DIASTOLIC BLOOD PRESSURE: 76 MMHG | TEMPERATURE: 97.2 F

## 2018-05-19 DIAGNOSIS — R55: Primary | ICD-10-CM

## 2018-05-19 DIAGNOSIS — Z72.0: ICD-10-CM

## 2018-05-19 DIAGNOSIS — Z79.899: ICD-10-CM

## 2018-05-19 DIAGNOSIS — Z87.820: ICD-10-CM

## 2018-05-19 DIAGNOSIS — Z79.01: ICD-10-CM

## 2018-05-19 LAB
ALBUMIN SERPL-MCNC: 4 GM/DL (ref 3.4–5)
ALP SERPL-CCNC: 55 U/L (ref 45–117)
ALT SERPL-CCNC: 54 U/L (ref 12–78)
AST SERPL-CCNC: 32 U/L (ref 15–37)
BASOPHILS # BLD AUTO: 0 TH/MM3 (ref 0–0.2)
BASOPHILS NFR BLD: 0.3 % (ref 0–2)
BILIRUB SERPL-MCNC: 0.3 MG/DL (ref 0.2–1)
BUN SERPL-MCNC: 7 MG/DL (ref 7–18)
CALCIUM SERPL-MCNC: 8.8 MG/DL (ref 8.5–10.1)
CHLORIDE SERPL-SCNC: 101 MEQ/L (ref 98–107)
CREAT SERPL-MCNC: 0.79 MG/DL (ref 0.6–1.3)
EOSINOPHIL # BLD: 0.2 TH/MM3 (ref 0–0.4)
EOSINOPHIL NFR BLD: 1.7 % (ref 0–4)
ERYTHROCYTE [DISTWIDTH] IN BLOOD BY AUTOMATED COUNT: 12.9 % (ref 11.6–17.2)
GFR SERPLBLD BASED ON 1.73 SQ M-ARVRAT: 124 ML/MIN (ref 89–?)
GLUCOSE SERPL-MCNC: 85 MG/DL (ref 74–106)
HCO3 BLD-SCNC: 32.8 MEQ/L (ref 21–32)
HCT VFR BLD CALC: 41.1 % (ref 39–51)
HGB BLD-MCNC: 14.1 GM/DL (ref 13–17)
INR PPP: 1 RATIO
LYMPHOCYTES # BLD AUTO: 2 TH/MM3 (ref 1–4.8)
LYMPHOCYTES NFR BLD AUTO: 21.3 % (ref 9–44)
MCH RBC QN AUTO: 32.1 PG (ref 27–34)
MCHC RBC AUTO-ENTMCNC: 34.4 % (ref 32–36)
MCV RBC AUTO: 93.4 FL (ref 80–100)
MONOCYTE #: 0.7 TH/MM3 (ref 0–0.9)
MONOCYTES NFR BLD: 7.2 % (ref 0–8)
NEUTROPHILS # BLD AUTO: 6.5 TH/MM3 (ref 1.8–7.7)
NEUTROPHILS NFR BLD AUTO: 69.5 % (ref 16–70)
PLATELET # BLD: 351 TH/MM3 (ref 150–450)
PMV BLD AUTO: 8.3 FL (ref 7–11)
PROT SERPL-MCNC: 7.3 GM/DL (ref 6.4–8.2)
PROTHROMBIN TIME: 10.1 SEC (ref 9.8–11.6)
RBC # BLD AUTO: 4.4 MIL/MM3 (ref 4.5–5.9)
SODIUM SERPL-SCNC: 138 MEQ/L (ref 136–145)
WBC # BLD AUTO: 9.3 TH/MM3 (ref 4–11)

## 2018-05-19 PROCEDURE — 99285 EMERGENCY DEPT VISIT HI MDM: CPT

## 2018-05-19 PROCEDURE — 85730 THROMBOPLASTIN TIME PARTIAL: CPT

## 2018-05-19 PROCEDURE — 82140 ASSAY OF AMMONIA: CPT

## 2018-05-19 PROCEDURE — 80307 DRUG TEST PRSMV CHEM ANLYZR: CPT

## 2018-05-19 PROCEDURE — 93005 ELECTROCARDIOGRAM TRACING: CPT

## 2018-05-19 PROCEDURE — 85025 COMPLETE CBC W/AUTO DIFF WBC: CPT

## 2018-05-19 PROCEDURE — 80053 COMPREHEN METABOLIC PANEL: CPT

## 2018-05-19 PROCEDURE — 85610 PROTHROMBIN TIME: CPT

## 2018-05-19 PROCEDURE — 96360 HYDRATION IV INFUSION INIT: CPT

## 2018-05-19 PROCEDURE — 70450 CT HEAD/BRAIN W/O DYE: CPT
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
## 2018-05-20 NOTE — EKG
Date Performed: 05/19/2018       Time Performed: 15:25:45

 

PTAGE:      23 years

 

EKG:      Sinus rhythm 

 

 NORMAL ECG 

 

NO PREVIOUS TRACING            

 

DOCTOR:   Thomas Carter  Interpretating Date/Time  05/20/2018 21:27:38

## 2018-05-21 ENCOUNTER — HOSPITAL ENCOUNTER (EMERGENCY)
Dept: HOSPITAL 17 - NEPE | Age: 23
LOS: 1 days | Discharge: HOME | End: 2018-05-22
Payer: MEDICAID

## 2018-05-21 VITALS
DIASTOLIC BLOOD PRESSURE: 62 MMHG | SYSTOLIC BLOOD PRESSURE: 119 MMHG | HEART RATE: 73 BPM | OXYGEN SATURATION: 98 % | RESPIRATION RATE: 18 BRPM

## 2018-05-21 VITALS
OXYGEN SATURATION: 96 % | SYSTOLIC BLOOD PRESSURE: 130 MMHG | RESPIRATION RATE: 14 BRPM | TEMPERATURE: 98 F | HEART RATE: 88 BPM | DIASTOLIC BLOOD PRESSURE: 81 MMHG

## 2018-05-21 VITALS
RESPIRATION RATE: 18 BRPM | DIASTOLIC BLOOD PRESSURE: 77 MMHG | HEART RATE: 76 BPM | OXYGEN SATURATION: 97 % | SYSTOLIC BLOOD PRESSURE: 129 MMHG

## 2018-05-21 VITALS — OXYGEN SATURATION: 97 % | RESPIRATION RATE: 16 BRPM

## 2018-05-21 VITALS — BODY MASS INDEX: 21.26 KG/M2 | WEIGHT: 132.28 LBS | HEIGHT: 66 IN

## 2018-05-21 DIAGNOSIS — F31.9: ICD-10-CM

## 2018-05-21 DIAGNOSIS — T40.601A: Primary | ICD-10-CM

## 2018-05-21 DIAGNOSIS — M54.2: ICD-10-CM

## 2018-05-21 DIAGNOSIS — Z79.899: ICD-10-CM

## 2018-05-21 DIAGNOSIS — F12.90: ICD-10-CM

## 2018-05-21 DIAGNOSIS — F17.200: ICD-10-CM

## 2018-05-21 LAB
ALBUMIN SERPL-MCNC: 4 GM/DL (ref 3.4–5)
ALP SERPL-CCNC: 58 U/L (ref 45–117)
ALT SERPL-CCNC: 54 U/L (ref 12–78)
APAP SERPL-MCNC: (no result) MCG/ML (ref 10–30)
AST SERPL-CCNC: 28 U/L (ref 15–37)
BASOPHILS # BLD AUTO: 0 TH/MM3 (ref 0–0.2)
BASOPHILS NFR BLD: 0.2 % (ref 0–2)
BILIRUB SERPL-MCNC: 0.2 MG/DL (ref 0.2–1)
BUN SERPL-MCNC: 7 MG/DL (ref 7–18)
CALCIUM SERPL-MCNC: 8.9 MG/DL (ref 8.5–10.1)
CHLORIDE SERPL-SCNC: 103 MEQ/L (ref 98–107)
CREAT SERPL-MCNC: 0.92 MG/DL (ref 0.6–1.3)
EOSINOPHIL # BLD: 0 TH/MM3 (ref 0–0.4)
EOSINOPHIL NFR BLD: 0.2 % (ref 0–4)
ERYTHROCYTE [DISTWIDTH] IN BLOOD BY AUTOMATED COUNT: 13.1 % (ref 11.6–17.2)
GFR SERPLBLD BASED ON 1.73 SQ M-ARVRAT: 104 ML/MIN (ref 89–?)
GLUCOSE SERPL-MCNC: 139 MG/DL (ref 74–106)
HCO3 BLD-SCNC: 27 MEQ/L (ref 21–32)
HCT VFR BLD CALC: 41.7 % (ref 39–51)
HGB BLD-MCNC: 14 GM/DL (ref 13–17)
LYMPHOCYTES # BLD AUTO: 1.2 TH/MM3 (ref 1–4.8)
LYMPHOCYTES NFR BLD AUTO: 9.7 % (ref 9–44)
MCH RBC QN AUTO: 31.1 PG (ref 27–34)
MCHC RBC AUTO-ENTMCNC: 33.5 % (ref 32–36)
MCV RBC AUTO: 92.7 FL (ref 80–100)
MONOCYTE #: 0.6 TH/MM3 (ref 0–0.9)
MONOCYTES NFR BLD: 5 % (ref 0–8)
NEUTROPHILS # BLD AUTO: 10.5 TH/MM3 (ref 1.8–7.7)
NEUTROPHILS NFR BLD AUTO: 84.9 % (ref 16–70)
PLATELET # BLD: 395 TH/MM3 (ref 150–450)
PMV BLD AUTO: 8.6 FL (ref 7–11)
PROT SERPL-MCNC: 7.6 GM/DL (ref 6.4–8.2)
RBC # BLD AUTO: 4.5 MIL/MM3 (ref 4.5–5.9)
SODIUM SERPL-SCNC: 140 MEQ/L (ref 136–145)
WBC # BLD AUTO: 12.4 TH/MM3 (ref 4–11)

## 2018-05-21 PROCEDURE — 81001 URINALYSIS AUTO W/SCOPE: CPT

## 2018-05-21 PROCEDURE — 93005 ELECTROCARDIOGRAM TRACING: CPT

## 2018-05-21 PROCEDURE — 85025 COMPLETE CBC W/AUTO DIFF WBC: CPT

## 2018-05-21 PROCEDURE — 80307 DRUG TEST PRSMV CHEM ANLYZR: CPT

## 2018-05-21 PROCEDURE — 96360 HYDRATION IV INFUSION INIT: CPT

## 2018-05-21 PROCEDURE — 80053 COMPREHEN METABOLIC PANEL: CPT

## 2018-05-21 PROCEDURE — 99284 EMERGENCY DEPT VISIT MOD MDM: CPT
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
## 2018-05-22 VITALS
RESPIRATION RATE: 18 BRPM | SYSTOLIC BLOOD PRESSURE: 124 MMHG | OXYGEN SATURATION: 97 % | DIASTOLIC BLOOD PRESSURE: 68 MMHG | HEART RATE: 69 BPM

## 2018-05-22 VITALS — SYSTOLIC BLOOD PRESSURE: 125 MMHG | DIASTOLIC BLOOD PRESSURE: 69 MMHG

## 2018-05-22 LAB
AMORPHOUS SEDIMENT, URINE: (no result)
COLOR UR: YELLOW
GLUCOSE UR STRIP-MCNC: (no result) MG/DL
HGB UR QL STRIP: (no result)
HYALINE CASTS #/AREA URNS LPF: 89 /LPF
KETONES UR STRIP-MCNC: (no result) MG/DL
MUCOUS THREADS #/AREA URNS LPF: (no result) /LPF
NITRITE UR QL STRIP: (no result)
SP GR UR STRIP: 1.02 (ref 1–1.03)
SQUAMOUS #/AREA URNS HPF: <1 /HPF (ref 0–5)
URINE LEUKOCYTE ESTERASE: (no result)

## 2018-05-22 NOTE — EKG
Date Performed: 05/21/2018       Time Performed: 22:35:57

 

PTAGE:      23 years

 

EKG:      Sinus rhythm 

 

 NORMAL ECG

 

PREVIOUS TRACING        05/19/18 @15.25.45 Since the previous tracing, no significant change noted

 

DOCTOR:   Salomon Hoover  Interpretating Date/Time  05/22/2018 16:49:44